# Patient Record
Sex: FEMALE | Race: WHITE | NOT HISPANIC OR LATINO | Employment: FULL TIME | URBAN - METROPOLITAN AREA
[De-identification: names, ages, dates, MRNs, and addresses within clinical notes are randomized per-mention and may not be internally consistent; named-entity substitution may affect disease eponyms.]

---

## 2018-11-08 ENCOUNTER — HOSPITAL ENCOUNTER (EMERGENCY)
Facility: CLINIC | Age: 38
Discharge: HOME | End: 2018-11-08
Attending: EMERGENCY MEDICINE | Admitting: EMERGENCY MEDICINE
Payer: COMMERCIAL

## 2018-11-08 ENCOUNTER — APPOINTMENT (OUTPATIENT)
Dept: CT IMAGING | Facility: CLINIC | Age: 38
End: 2018-11-08
Attending: EMERGENCY MEDICINE
Payer: COMMERCIAL

## 2018-11-08 VITALS
OXYGEN SATURATION: 93 % | TEMPERATURE: 98.7 F | SYSTOLIC BLOOD PRESSURE: 105 MMHG | DIASTOLIC BLOOD PRESSURE: 70 MMHG | RESPIRATION RATE: 22 BRPM | HEART RATE: 78 BPM

## 2018-11-08 DIAGNOSIS — N20.1 CALCULUS OF URETER: ICD-10-CM

## 2018-11-08 PROBLEM — R00.2 PALPITATIONS: Status: ACTIVE | Noted: 2018-08-05

## 2018-11-08 PROBLEM — G47.19 EXCESSIVE DAYTIME SLEEPINESS: Status: ACTIVE | Noted: 2018-08-05

## 2018-11-08 PROBLEM — R03.0 ELEVATED BLOOD PRESSURE READING WITHOUT DIAGNOSIS OF HYPERTENSION: Status: ACTIVE | Noted: 2018-08-05

## 2018-11-08 PROBLEM — R00.0 SINUS TACHYCARDIA: Status: ACTIVE | Noted: 2018-08-05

## 2018-11-08 LAB
ALBUMIN UR-MCNC: NEGATIVE MG/DL
ANION GAP SERPL CALCULATED.3IONS-SCNC: 7 MMOL/L (ref 3–14)
APPEARANCE UR: ABNORMAL
BILIRUB UR QL STRIP: NEGATIVE
BUN SERPL-MCNC: 16 MG/DL (ref 7–30)
CALCIUM SERPL-MCNC: 9.2 MG/DL (ref 8.5–10.1)
CHLORIDE SERPL-SCNC: 107 MMOL/L (ref 94–109)
CO2 SERPL-SCNC: 26 MMOL/L (ref 20–32)
COLOR UR AUTO: YELLOW
CREAT SERPL-MCNC: 0.91 MG/DL (ref 0.52–1.04)
GFR SERPL CREATININE-BSD FRML MDRD: 68 ML/MIN/1.7M2
GLUCOSE SERPL-MCNC: 98 MG/DL (ref 70–99)
GLUCOSE UR STRIP-MCNC: NEGATIVE MG/DL
HGB UR QL STRIP: ABNORMAL
HYALINE CASTS #/AREA URNS LPF: 1 /LPF (ref 0–2)
KETONES UR STRIP-MCNC: NEGATIVE MG/DL
LEUKOCYTE ESTERASE UR QL STRIP: ABNORMAL
MUCOUS THREADS #/AREA URNS LPF: PRESENT /LPF
NITRATE UR QL: NEGATIVE
PH UR STRIP: 7 PH (ref 5–7)
POTASSIUM SERPL-SCNC: 4.4 MMOL/L (ref 3.4–5.3)
RBC #/AREA URNS AUTO: 50 /HPF (ref 0–2)
SODIUM SERPL-SCNC: 140 MMOL/L (ref 133–144)
SOURCE: ABNORMAL
SP GR UR STRIP: 1.02 (ref 1–1.03)
SQUAMOUS #/AREA URNS AUTO: 21 /HPF (ref 0–1)
UROBILINOGEN UR STRIP-MCNC: 0 MG/DL (ref 0–2)
WBC #/AREA URNS AUTO: 7 /HPF (ref 0–5)

## 2018-11-08 PROCEDURE — 81001 URINALYSIS AUTO W/SCOPE: CPT | Performed by: EMERGENCY MEDICINE

## 2018-11-08 PROCEDURE — 96374 THER/PROPH/DIAG INJ IV PUSH: CPT

## 2018-11-08 PROCEDURE — 25000128 H RX IP 250 OP 636: Performed by: EMERGENCY MEDICINE

## 2018-11-08 PROCEDURE — 74176 CT ABD & PELVIS W/O CONTRAST: CPT

## 2018-11-08 PROCEDURE — 80048 BASIC METABOLIC PNL TOTAL CA: CPT | Performed by: EMERGENCY MEDICINE

## 2018-11-08 PROCEDURE — 99285 EMERGENCY DEPT VISIT HI MDM: CPT | Mod: 25

## 2018-11-08 PROCEDURE — 96376 TX/PRO/DX INJ SAME DRUG ADON: CPT

## 2018-11-08 PROCEDURE — 96375 TX/PRO/DX INJ NEW DRUG ADDON: CPT

## 2018-11-08 PROCEDURE — 96361 HYDRATE IV INFUSION ADD-ON: CPT

## 2018-11-08 RX ORDER — IBUPROFEN 800 MG/1
800 TABLET, FILM COATED ORAL EVERY 8 HOURS PRN
Qty: 24 TABLET | Refills: 0 | Status: SHIPPED | OUTPATIENT
Start: 2018-11-08 | End: 2018-11-15

## 2018-11-08 RX ORDER — OXYCODONE HYDROCHLORIDE 5 MG/1
5 TABLET ORAL EVERY 6 HOURS PRN
Qty: 12 TABLET | Refills: 0 | Status: SHIPPED | OUTPATIENT
Start: 2018-11-08 | End: 2022-05-07

## 2018-11-08 RX ORDER — ONDANSETRON 2 MG/ML
4 INJECTION INTRAMUSCULAR; INTRAVENOUS
Status: COMPLETED | OUTPATIENT
Start: 2018-11-08 | End: 2018-11-08

## 2018-11-08 RX ORDER — KETOROLAC TROMETHAMINE 15 MG/ML
15 INJECTION, SOLUTION INTRAMUSCULAR; INTRAVENOUS ONCE
Status: COMPLETED | OUTPATIENT
Start: 2018-11-08 | End: 2018-11-08

## 2018-11-08 RX ORDER — HYDROMORPHONE HYDROCHLORIDE 1 MG/ML
0.5 INJECTION, SOLUTION INTRAMUSCULAR; INTRAVENOUS; SUBCUTANEOUS
Status: DISCONTINUED | OUTPATIENT
Start: 2018-11-08 | End: 2018-11-08 | Stop reason: HOSPADM

## 2018-11-08 RX ORDER — ONDANSETRON 4 MG/1
4 TABLET, ORALLY DISINTEGRATING ORAL EVERY 8 HOURS PRN
Qty: 10 TABLET | Refills: 0 | Status: SHIPPED | OUTPATIENT
Start: 2018-11-08 | End: 2018-11-11

## 2018-11-08 RX ORDER — SODIUM CHLORIDE 9 MG/ML
1000 INJECTION, SOLUTION INTRAVENOUS CONTINUOUS
Status: DISCONTINUED | OUTPATIENT
Start: 2018-11-08 | End: 2018-11-08 | Stop reason: HOSPADM

## 2018-11-08 RX ORDER — ACETAMINOPHEN 500 MG
500-1000 TABLET ORAL EVERY 8 HOURS PRN
Qty: 1 TABLET | Refills: 0 | Status: SHIPPED | OUTPATIENT
Start: 2018-11-08 | End: 2018-11-18

## 2018-11-08 RX ORDER — FLUOXETINE 10 MG/1
10 CAPSULE ORAL DAILY
COMMUNITY
Start: 2018-04-29

## 2018-11-08 RX ADMIN — KETOROLAC TROMETHAMINE 15 MG: 15 INJECTION, SOLUTION INTRAMUSCULAR; INTRAVENOUS at 09:30

## 2018-11-08 RX ADMIN — Medication 0.5 MG: at 09:30

## 2018-11-08 RX ADMIN — SODIUM CHLORIDE 1000 ML: 9 INJECTION, SOLUTION INTRAVENOUS at 09:30

## 2018-11-08 RX ADMIN — ONDANSETRON 4 MG: 2 INJECTION INTRAMUSCULAR; INTRAVENOUS at 09:30

## 2018-11-08 RX ADMIN — Medication 0.5 MG: at 11:50

## 2018-11-08 ASSESSMENT — ENCOUNTER SYMPTOMS
DIARRHEA: 0
NAUSEA: 1
FEVER: 0
DYSURIA: 0
HEMATURIA: 0
FLANK PAIN: 1

## 2018-11-08 NOTE — ED AVS SNAPSHOT
[facility] Emergency Department    [address]    [facility] [address]    Phone:  [phone]    Fax:  [phone]                                       Manjula Boles   MRN: [id number]    Department:  [facility] Emergency Department   Date of Visit:  11/8/2018           Patient Information     Date Of Birth          1980        Your diagnoses for this visit were:     Calculus of ureter        You were seen by Maria Elena Aguilar MD.      Follow-up Information     Follow up with [facility], [facility] In 4 days.    Contact information:    [address]  [facility] [place] [address] [phone]          Follow up with [facility] [place].    Contact information:    [address]  [address]  [facility] [address] [phone]        Discharge Instructions       Discharge Instructions  Kidney Stones    Kidney stones are a common problem that can cause a lot of pain but fortunately are usually not dangerous. Kidney stones form in the kidney and then can cause a blockage (obstruction) of the flow of urine from the kidney which leads to pain. Most patients can manage kidney stones at home (without a hospital stay).  However, sometimes your condition may be worse than it seemed at first, or may get worse with time. Most kidney stones will pass on their own, but occasionally stones may need to be removed by an urologist.    Generally, every Emergency Department visit should have a follow-up clinic visit with either a primary or a specialty clinic/provider. Please follow-up as instructed by your emergency provider today.      Return to the Emergency Department if:    Your pain is not controlled despite the medications provided or recommended.    You are vomiting (throwing up) and cannot keep fluids or medications down.    You develop a fever (>100.4 F).    You feel much more ill or develop new symptoms.  What can I do to help  myself?    Be sure to drink plenty of fluids.    If instructed to do so, strain your urine (pee) with the urine strainer you were provided with today. Your stone may look like a grain of sand or a small pebble. Collect any stones in the cup provided and bring to your follow-up appointment.    Staying active is good, and may help the stone to pass. You may do whatever you feel up to doing without restrictions.   Treatment:    Non-steroidal anti-inflammatory drugs (NSAIDs). This includes prescription medicines like Toradol  (ketorolac) and non-prescription medicines like Advil  (ibuprofen) and Nuprin  (ibuprofen) and Naproxen. These pain relievers are very effective for kidney stones.    Nausea (sick to your stomach) medication.  Nausea and vomiting are common with kidney stones, so your provider may send you home with medicine for this.     Flomax  (tamsulosin). This medicine is sometimes used for men with prostate problems, but also can help kidney stones to pass. Its effectiveness is controversial or questionable so it is prescribed in certain situations. This medicine can lower blood pressure, and you may feel faint/lightheaded, especially when you first stand up. Be sure to get up gradually, sit down if you feel faint, and avoid activity where feeling faint would be dangerous, such as climbing ladders.  If you were given a prescription for medicine here today, be sure to read all of the information (including the package insert) that comes with your prescription.  This will include important information about the medicine, its side effects, and any warnings that you need to know about.  The pharmacist who fills the prescription can provide more information and answer questions you may have about the medicine.  If you have questions or concerns that the pharmacist cannot address, please call or return to the Emergency Department.   Remember that you can always come back to the Emergency Department if you are not  able to see your regular provider in the amount of time listed above, if you get any new symptoms, or if there is anything that worries you.  Opioid Medication Discharge Instructions    You have been given a prescription for an opioid (narcotic) pain medicine and/or have   received a pain medicine while here in the emergency department. These medicines can make you drowsy or impaired.     You must not drive, operate dangerous equipment, or   engage in any other dangerous activities while taking these medications. If you drive while taking these medications, you could be arrested for DUI, or driving under the   influence. Do not drink any alcohol while you are taking these medications.     Opioid pain medications can cause addiction. If you have a history of chemical   dependency of any type, you are at a higher risk of becoming addicted to pain   medications. Only take these prescribed medications to treat your pain when all other   options have been tried. Take it for as short a time and as few doses as possible.     Store your pain pills in a secure place, as they are frequently stolen and provide a dangerous opportunity for children or visitors in your house to start abusing these powerful medications. We will not replace any lost or stolen medicine.     As soon as your pain is better, you should safely dispose of all your remaining medication.     Many prescription pain medications contain Tylenol (acetaminophen), including Vicodin, Tylenol #3, Norco, Lortab, and Percocet. You should not take any extra pills of Tylenol if you are using these prescription medications or you can get very sick. Do not ever take more than 4000 mg of acetaminophen in any 24 hour period.    All opioids tend to cause constipation. Drink plenty of water and eat foods that have   a lot of fiber, such as fruits, vegetables, prune juice, apple juice and high fiber cereal.   Take a laxative if you don t move your bowels at least every other  day. Miralax, Milk of   Magnesia, Colace, or Senna can be used to keep you regular.        24 Hour Appointment Hotline       To make an appointment at any [facility], call [phone] ([phone]). If you don't have a family doctor or clinic, we will help you find one. [place] clinics are conveniently located to serve the needs of you and your family.             Review of your medicines      START taking        Dose / Directions Last dose taken    acetaminophen 500 MG tablet   Commonly known as:  TYLENOL   Dose:  500-1000 mg   Quantity:  1 tablet        Take 1-2 tablets (500-1,000 mg) by mouth every 8 hours as needed for mild pain (DO NOT FILL! For dosing only.) DO NOT FILL!  For Dosing Only   Refills:  0        ibuprofen 800 MG tablet   Commonly known as:  ADVIL/MOTRIN   Dose:  800 mg   Quantity:  24 tablet        Take 1 tablet (800 mg) by mouth every 8 hours as needed for moderate pain   Refills:  0        ondansetron 4 MG ODT tab   Commonly known as:  ZOFRAN ODT   Dose:  4 mg   Quantity:  10 tablet        Take 1 tablet (4 mg) by mouth every 8 hours as needed for nausea   Refills:  0        oxyCODONE IR 5 MG tablet   Commonly known as:  ROXICODONE   Dose:  5 mg   Quantity:  12 tablet        Take 1 tablet (5 mg) by mouth every 6 hours as needed for pain   Refills:  0          Our records show that you are taking the medicines listed below. If these are incorrect, please call your family doctor or clinic.        Dose / Directions Last dose taken    ALEVE PO        Refills:  0        FLUoxetine 10 MG capsule   Commonly known as:  PROzac   Dose:  10 mg        Take 10 mg by mouth daily   Refills:  0        IMITREX PO        Refills:  0        WELLBUTRIN PO        Refills:  0                Information about OPIOIDS     PRESCRIPTION OPIOIDS: WHAT YOU NEED TO KNOW   We gave you an opioid (narcotic) pain medicine. It is important to manage your pain, but opioids are not always the best choice. You should  first try all the other options your care team gave you. Take this medicine for as short a time (and as few doses) as possible.    Some activities can increase your pain, such as bandage changes or therapy sessions. It may help to take your pain medicine 30 to 60 minutes before these activities. Reduce your stress by getting enough sleep, working on hobbies you enjoy and practicing relaxation or meditation. Talk to your care team about ways to manage your pain beyond prescription opioids.    These medicines have risks:    DO NOT drive when on new or higher doses of pain medicine. These medicines can affect your alertness and reaction times, and you could be arrested for driving under the influence (DUI). If you need to use opioids long-term, talk to your care team about driving.    DO NOT operate heavy machinery    DO NOT do any other dangerous activities while taking these medicines.    DO NOT drink any alcohol while taking these medicines.     If the opioid prescribed includes acetaminophen, DO NOT take with any other medicines that contain acetaminophen. Read all labels carefully. Look for the word  acetaminophen  or  Tylenol.  Ask your pharmacist if you have questions or are unsure.    You can get addicted to pain medicines, especially if you have a history of addiction (chemical, alcohol or substance dependence). Talk to your care team about ways to reduce this risk.    All opioids tend to cause constipation. Drink plenty of water and eat foods that have a lot of fiber, such as fruits, vegetables, prune juice, apple juice and high-fiber cereal. Take a laxative (Miralax, milk of magnesia, Colace, Senna) if you don t move your bowels at least every other day. Other side effects include upset stomach, sleepiness, dizziness, throwing up, tolerance (needing more of the medicine to have the same effect), physical dependence and slowed breathing.    Store your pills in a secure place, locked if possible. We will not  replace any lost or stolen medicine. If you don t finish your medicine, please throw away (dispose) as directed by your pharmacist. The [place] [employer] has more information about safe disposal: https://www.pca.state.[place]/living-green/managing-unwanted-medications        Prescriptions were sent or printed at these locations (4 Prescriptions)                   Other Prescriptions                Printed at Department/Unit printer (4 of 4)         acetaminophen (TYLENOL) 500 MG tablet               ibuprofen (ADVIL/MOTRIN) 800 MG tablet               ondansetron (ZOFRAN ODT) 4 MG ODT tab               oxyCODONE IR (ROXICODONE) 5 MG tablet                Procedures and tests performed during your visit     Basic metabolic panel    CT Abdomen Pelvis w/o Contrast    Peripheral IV: Standard    Pulse oximetry nursing    UA with Microscopic      Orders Needing Specimen Collection     None      Pending Results     Date and Time Order Name Status Description    11/8/2018 0909 CT Abdomen Pelvis w/o Contrast Preliminary             Pending Culture Results     No orders found from 11/6/2018 to 11/9/2018.            Pending Results Instructions     If you had any lab results that were not finalized at the time of your Discharge, you can call the ED Lab Result RN at [phone]. You will be contacted by this team for any positive Lab results or changes in treatment. The nurses are available 7 days a week from 10A to 6:30P.  You can leave a message 24 hours per day and they will return your call.        Test Results From Your Hospital Stay        11/8/2018 10:04 AM      Component Results     Component Value Ref Range & Units Status    Sodium 140 133 - 144 mmol/L Final    Potassium 4.4 3.4 - 5.3 mmol/L Final    Chloride 107 94 - 109 mmol/L Final    Carbon Dioxide 26 20 - 32 mmol/L Final    Anion Gap 7 3 - 14 mmol/L Final    Glucose 98 70 - 99 mg/dL Final    Urea Nitrogen 16 7 - 30 mg/dL Final    Creatinine 0.91  0.52 - 1.04 mg/dL Final    GFR Estimate 68 >60 mL/min/1.7m2 Final    Non  GFR Calc    GFR Estimate If Black 83 >60 mL/min/1.7m2 Final    African American GFR Calc    Calcium 9.2 8.5 - 10.1 mg/dL Final         11/8/2018 10:18 AM      Component Results     Component Value Ref Range & Units Status    Color Urine Yellow  Final    Appearance Urine Cloudy  Final    Glucose Urine Negative NEG^Negative mg/dL Final    Bilirubin Urine Negative NEG^Negative Final    Ketones Urine Negative NEG^Negative mg/dL Final    Specific Gravity Urine 1.021 1.003 - 1.035 Final    Blood Urine Small (A) NEG^Negative Final    pH Urine 7.0 5.0 - 7.0 pH Final    Protein Albumin Urine Negative NEG^Negative mg/dL Final    Urobilinogen mg/dL 0.0 0.0 - 2.0 mg/dL Final    Nitrite Urine Negative NEG^Negative Final    Leukocyte Esterase Urine Trace (A) NEG^Negative Final    Source Midstream Urine  Final    WBC Urine 7 (H) 0 - 5 /HPF Final    RBC Urine 50 (H) 0 - 2 /HPF Final    Squamous Epithelial /HPF Urine 21 (H) 0 - 1 /HPF Final    Mucous Urine Present (A) NEG^Negative /LPF Final    Hyaline Casts 1 0 - 2 /LPF Final         11/8/2018 10:46 AM      Narrative     CT ABDOMEN AND PELVIS WITHOUT CONTRAST 11/8/2018 10:32 AM    HISTORY: Right flank and right lower quadrant pain.    TECHNIQUE: Helical axial scans from the dome of liver through the  pubic symphysis without contrast. Radiation dose for this scan was  reduced using automated exposure control, adjustment of the mA and/or  kV according to patient size, or iterative reconstruction technique.    COMPARISON: None.    FINDINGS: There is a 3.5 mm calculus in the distal right ureter just  proximal to the ureterovesical orifice resulting in right-sided  hydronephrosis and hydroureter. No other right-sided urinary tract  calculi. No left-sided urinary tract calculi or obstruction.    There is a very small hiatal hernia. The liver, spleen, pancreas,  bilateral adrenal glands and remainder  of the kidneys bilaterally  appear normal without contrast. The bowel and mesentery in the upper  abdomen are unremarkable with the exception of a few slightly  prominent mesenteric lymph nodes in the right lower quadrant (image 60  of series 3).    Scans through the pelvis show no additional abnormality or free fluid.  The appendix is partially visualized with no evidence for  appendicitis.        Impression     IMPRESSION:  1. 3.5 mm obstructing calculus distal right ureter with right-sided  hydronephrosis and hydroureter.  2. Few slightly prominent mesenteric lymph nodes right lower quadrant  of uncertain etiology and significance.  3. Very small hiatal hernia.                Clinical Quality Measure: Blood Pressure Screening     Your blood pressure was checked while you were in the emergency department today. The last reading we obtained was  BP: 92/47 . Please read the guidelines below about what these numbers mean and what you should do about them.  If your systolic blood pressure (the top number) is less than 120 and your diastolic blood pressure (the bottom number) is less than 80, then your blood pressure is normal. There is nothing more that you need to do about it.  If your systolic blood pressure (the top number) is 120-139 or your diastolic blood pressure (the bottom number) is 80-89, your blood pressure may be higher than it should be. You should have your blood pressure rechecked within a year by a primary care provider.  If your systolic blood pressure (the top number) is 140 or greater or your diastolic blood pressure (the bottom number) is 90 or greater, you may have high blood pressure. High blood pressure is treatable, but if left untreated over time it can put you at risk for heart attack, stroke, or kidney failure. You should have your blood pressure rechecked by a primary care provider within the next 4 weeks.  If your provider in the emergency department today gave you specific instructions  "to follow-up with your doctor or provider even sooner than that, you should follow that instruction and not wait for up to 4 weeks for your follow-up visit.        Thank you for choosing [facility]       Thank you for choosing [facility] for your care. Our goal is always to provide you with excellent care. Hearing back from our patients is one way we can continue to improve our services. Please take a few minutes to complete the written survey that you may receive in the mail after you visit with us. Thank you!        [facility] Information     [employer] lets you send messages to your doctor, view your test results, renew your prescriptions, schedule appointments and more. To sign up, go to [email or web address] . Click on \"Log in\" on the left side of the screen, which will take you to the Welcome page. Then click on \"Sign up Now\" on the right side of the page.     You will be asked to enter the access code listed below, as well as some personal information. Please follow the directions to create your username and password.     Your access code is: [email or web address]  Expires: 2019 12:16 PM     Your access code will  in 90 days. If you need help or a new code, please call your [facility] clinic or [phone].        Care EveryWhere ID     This is your Care EveryWhere ID. This could be used by other organizations to access your [facility] medical records  [id number]        Equal Access to Services     LUZ DUGGAN AH: Hadii fahad Colbert, waaxda lueden, qaybta kaalmajames chaves, sharath dietrich . So [facility] [phone].    ATENCIÓN: Si habla español, tiene a fletcher disposición servicios gratuitos de asistencia lingüística. Chucho al [phone].    We comply with applicable federal civil rights laws and [place] laws. We do not discriminate on the basis of race, color, national origin, age, disability, sex, sexual orientation, or gender identity.            After Visit Summary       This " is your record. Keep this with you and show to your community pharmacist(s) and doctor(s) at your next visit.

## 2018-11-08 NOTE — DISCHARGE INSTRUCTIONS
Discharge Instructions  Kidney Stones    Kidney stones are a common problem that can cause a lot of pain but fortunately are usually not dangerous. Kidney stones form in the kidney and then can cause a blockage (obstruction) of the flow of urine from the kidney which leads to pain. Most patients can manage kidney stones at home (without a hospital stay).  However, sometimes your condition may be worse than it seemed at first, or may get worse with time. Most kidney stones will pass on their own, but occasionally stones may need to be removed by an urologist.    Generally, every Emergency Department visit should have a follow-up clinic visit with either a primary or a specialty clinic/provider. Please follow-up as instructed by your emergency provider today.      Return to the Emergency Department if:    Your pain is not controlled despite the medications provided or recommended.    You are vomiting (throwing up) and cannot keep fluids or medications down.    You develop a fever (>100.4 F).    You feel much more ill or develop new symptoms.  What can I do to help myself?    Be sure to drink plenty of fluids.    If instructed to do so, strain your urine (pee) with the urine strainer you were provided with today. Your stone may look like a grain of sand or a small pebble. Collect any stones in the cup provided and bring to your follow-up appointment.    Staying active is good, and may help the stone to pass. You may do whatever you feel up to doing without restrictions.   Treatment:    Non-steroidal anti-inflammatory drugs (NSAIDs). This includes prescription medicines like Toradol  (ketorolac) and non-prescription medicines like Advil  (ibuprofen) and Nuprin  (ibuprofen) and Naproxen. These pain relievers are very effective for kidney stones.    Nausea (sick to your stomach) medication.  Nausea and vomiting are common with kidney stones, so your provider may send you home with medicine for this.     Flomax   (tamsulosin). This medicine is sometimes used for men with prostate problems, but also can help kidney stones to pass. Its effectiveness is controversial or questionable so it is prescribed in certain situations. This medicine can lower blood pressure, and you may feel faint/lightheaded, especially when you first stand up. Be sure to get up gradually, sit down if you feel faint, and avoid activity where feeling faint would be dangerous, such as climbing ladders.  If you were given a prescription for medicine here today, be sure to read all of the information (including the package insert) that comes with your prescription.  This will include important information about the medicine, its side effects, and any warnings that you need to know about.  The pharmacist who fills the prescription can provide more information and answer questions you may have about the medicine.  If you have questions or concerns that the pharmacist cannot address, please call or return to the Emergency Department.   Remember that you can always come back to the Emergency Department if you are not able to see your regular provider in the amount of time listed above, if you get any new symptoms, or if there is anything that worries you.  Opioid Medication Discharge Instructions    You have been given a prescription for an opioid (narcotic) pain medicine and/or have   received a pain medicine while here in the emergency department. These medicines can make you drowsy or impaired.     You must not drive, operate dangerous equipment, or   engage in any other dangerous activities while taking these medications. If you drive while taking these medications, you could be arrested for DUI, or driving under the   influence. Do not drink any alcohol while you are taking these medications.     Opioid pain medications can cause addiction. If you have a history of chemical   dependency of any type, you are at a higher risk of becoming addicted to pain    medications. Only take these prescribed medications to treat your pain when all other   options have been tried. Take it for as short a time and as few doses as possible.     Store your pain pills in a secure place, as they are frequently stolen and provide a dangerous opportunity for children or visitors in your house to start abusing these powerful medications. We will not replace any lost or stolen medicine.     As soon as your pain is better, you should safely dispose of all your remaining medication.     Many prescription pain medications contain Tylenol (acetaminophen), including Vicodin, Tylenol #3, Norco, Lortab, and Percocet. You should not take any extra pills of Tylenol if you are using these prescription medications or you can get very sick. Do not ever take more than 4000 mg of acetaminophen in any 24 hour period.    All opioids tend to cause constipation. Drink plenty of water and eat foods that have   a lot of fiber, such as fruits, vegetables, prune juice, apple juice and high fiber cereal.   Take a laxative if you don t move your bowels at least every other day. Miralax, Milk of   Magnesia, Colace, or Senna can be used to keep you regular.

## 2018-11-08 NOTE — ED PROVIDER NOTES
History     Chief Complaint:  Flank Pain       HPI   Manjula Boles is a 37 year old female who presents with flank pain. The patient states that she was driving to work at 0730 this morning when she had the sudden onset of 8/10 right flank pain radiating to her right lower quadrant. She took pepto bismol as she felt like she was going to have diarrhea, however, her symptoms did not improve. The patient does have nausea. She denies any dysuria, hematuria, diarrhea, or fevers. She has never had similar symptoms in the past.     Allergies:  Sulfa Drugs      Medications:    Wellbutrin   Aleve   Imitrex    Past Medical History:    Depressive Disorder  Migraines     Past Surgical History:    Hysterectomy     Family History:    History reviewed. No pertinent family history.     Social History:  Marital Status:   Presents to the ED with   Tobacco Use: Never Used  Alcohol Use: Yes  PCP: Jessica Castillo        Review of Systems   Constitutional: Negative for fever.   Gastrointestinal: Positive for nausea. Negative for diarrhea.   Genitourinary: Positive for flank pain. Negative for dysuria and hematuria.   All other systems reviewed and are negative.      Physical Exam     Patient Vitals for the past 24 hrs:   BP Temp Temp src Pulse Resp SpO2   11/08/18 1130 101/66 - - - - 96 %   11/08/18 1115 117/76 - - - - 97 %   11/08/18 1100 102/76 - - - - 98 %   11/08/18 1045 107/66 - - - - 98 %   11/08/18 1000 123/82 - - - - 95 %   11/08/18 0945 121/78 - - - - 99 %   11/08/18 0930 133/81 - - - - 97 %   11/08/18 0915 144/88 - - - - 98 %   11/08/18 0903 (!) 174/94 98.7  F (37.1  C) Oral 98 22 100 %      Physical Exam   Nursing note and vitals reviewed.    Constitutional: Pleasant and well groomed. Appears Uncomfortable. Mildly pale appearing.          HENT:    Mouth/Throat: Oropharynx is without swelling or erythema. Oral mucosa moist.    Eyes: Conjunctivae are normal. No scleral icterus.    Neck: Neck supple.    Cardiovascular: Normal rate, regular rhythm and intact distal pulses.    Pulmonary/Chest: Effort normal and breath sounds normal.   Abdominal: Soft.  No distension. There is no tenderness.   Musculoskeletal:  No edema, No calf tenderness  Neurological:Alert and oriented. Coordination normal.   Skin: Skin is warm and dry.   Psychiatric: Normal mood and affect.      Emergency Department Course   Imaging:  CT Abdomen Pelvis without contrast;   1. 3.5 mm obstructing calculus distal right ureter with right-sided  hydronephrosis and hydroureter.  2. Few slightly prominent mesenteric lymph nodes right lower quadrant  of uncertain etiology and significance.  3. Very small hiatal hernia.  Preliminary radiology read.     Radiographic findings were communicated with the patient who voiced understanding of the findings.    Laboratory:  BMP:  WNL (Creatinine 0.91)     UA: Cloudy yellow urine, Blood small, Leukocyte Esterase trace, WBC 7 (H), RBC 50 (H), Squamous Epithelial 21 (H), Mucous present, otherwise WNL     Interventions:   (0930) Normal Saline, 1 liter, IV   (0930) Dilaudid, 0.5 mg, IV   (0930) Zofran, 4 mg, IV   (0930) Toradol, 15 mg, IV  (1150) Normal Saline, 1 liter, IV   (1150) Dilaudid, 0.5 mg, IV     Emergency Department Course:  Nursing notes and vitals reviewed.  (0902) I performed an exam of the patient as documented above.    A peripheral IV was established. Blood was drawn from the patient. This was sent for laboratory testing, findings above.    Urine sample was obtained and sent for laboratory analysis, findings above.   The patient was sent for a CT Abdomen while in the emergency department, findings above.    (1102) I updated the patient on results and plan.   (1141) I answered the patient's call light. Her pain is returning. RN is informed on need for additional pain medication.   Findings and plan explained to the patient. Patient discharged home with instructions regarding supportive care,  medications, and reasons to return. The importance of close follow-up was reviewed.   I personally reviewed the laboratory results with the patient and answered all related questions prior to discharge.      Impression & Plan      Medical Decision Making:  The patient presented with unilateral flank and abdominal pain. On arrival the differential diagnosis included, but was not limited to nephrolithiasis, pyelonephritis, AAA, ovarian torsion.  CT confirms a ureteral stone.  The patient's symptoms have been controlled with described interventions in the Emergency Department. There is no fever or evidence of a urinary tract infection.      The patient will be discharged with opioid analgesics and Ibuprofen for pain. I have explained the importance of short term NSAID use in the management of ureteral colic.     The patient understands to return to the ED with uncontrolled pain, vomiting, and fever.  I have recommended that she strain their urine to look for stone, if detected, submit to primary doctor/urologist for lab analysis.        Plan:   1. Return to the ED with new or worse symptoms  2. Follow up with your PMD in 4 days for ongoing evaluation and management  3. Follow up with urology within one week, sooner if pain continues, as retrieval of the stone may be required for refractory symptoms.  4. Provided with standard [facility] for Kid[facility] Stones  5. Prescriptions for tylenol, ibuprofen, Zofran, and Roxicodone were provided.        Diagnosis:    ICD-10-CM    1. Calculus of ureter N20.1        Disposition:  discharged to home    Discharge Medications:  New Prescriptions    ACETAMINOPHEN (TYLENOL) 500 MG TABLET    Take 1-2 tablets (500-1,000 mg) by mouth every 8 hours as needed for mild pain (DO NOT FILL! For dosing only.) DO NOT FILL!   For Dosing Only    IBUPROFEN (ADVIL/MOTRIN) 800 MG TABLET    Take 1 tablet (800 mg) by mouth every 8 hours as needed for moderate pain    ONDANSETRON (ZOFRAN  ODT) 4 MG ODT TAB    Take 1 tablet (4 mg) by mouth every 8 hours as needed for nausea    OXYCODONE IR (ROXICODONE) 5 MG TABLET    Take 1 tablet (5 mg) by mouth every 6 hours as needed for pain         IBrittany, am serving as a scribe on 11/8/2018 at 9:02 AM to personally document services performed by Dr. Maria Elena Aguilar based on my observations and the provider's statements to me.    11/8/2018   [facility] EMERGENCY DEPARTMENT       Maria Elena Aguilar MD  11/08/18 6406

## 2018-11-08 NOTE — ED AVS SNAPSHOT
[facility]    [address]    [facility] [address]    Phone:  [phone]    Fax:  [phone]                                       Manjula Boles   MRN: [id number]    Department:  [facility] Emergency Department   Date of Visit:  11/8/2018           After Visit Summary Signature Page     I have received my discharge instructions, and my questions have been answered. I have discussed any challenges I see with this plan with the nurse or doctor.    ..........................................................................................................................................  Patient/Patient Representative Signature      ..........................................................................................................................................  Patient Representative Print Name and Relationship to Patient    ..................................................               ................................................  Date                                   Time    ..........................................................................................................................................  Reviewed by Signature/Title    ...................................................              ..............................................  Date                                               Time          22EPIC Rev 08/18

## 2018-12-06 ENCOUNTER — OFFICE VISIT (OUTPATIENT)
Dept: UROLOGY | Facility: CLINIC | Age: 38
End: 2018-12-06
Payer: COMMERCIAL

## 2018-12-06 VITALS — OXYGEN SATURATION: 98 % | BODY MASS INDEX: 43.24 KG/M2 | HEIGHT: 62 IN | WEIGHT: 235 LBS | HEART RATE: 86 BPM

## 2018-12-06 DIAGNOSIS — N20.0 NEPHROLITHIASIS: Primary | ICD-10-CM

## 2018-12-06 DIAGNOSIS — R39.89 SENSATION OF PRESSURE IN BLADDER AREA: ICD-10-CM

## 2018-12-06 PROBLEM — E66.01 MORBID OBESITY (H): Status: ACTIVE | Noted: 2018-12-06

## 2018-12-06 LAB
ALBUMIN UR-MCNC: NEGATIVE MG/DL
APPEARANCE UR: CLEAR
BILIRUB UR QL STRIP: NEGATIVE
COLOR UR AUTO: YELLOW
GLUCOSE UR STRIP-MCNC: NEGATIVE MG/DL
HGB UR QL STRIP: ABNORMAL
KETONES UR STRIP-MCNC: NEGATIVE MG/DL
LEUKOCYTE ESTERASE UR QL STRIP: NEGATIVE
NITRATE UR QL: NEGATIVE
PH UR STRIP: 5.5 PH (ref 5–7)
RESIDUAL VOLUME (RV) (EXTERNAL): 39
SOURCE: ABNORMAL
SP GR UR STRIP: >1.03 (ref 1–1.03)
UROBILINOGEN UR STRIP-ACNC: 0.2 EU/DL (ref 0.2–1)

## 2018-12-06 PROCEDURE — 99243 OFF/OP CNSLTJ NEW/EST LOW 30: CPT | Performed by: UROLOGY

## 2018-12-06 PROCEDURE — 81003 URINALYSIS AUTO W/O SCOPE: CPT | Performed by: UROLOGY

## 2018-12-06 ASSESSMENT — PAIN SCALES - GENERAL: PAINLEVEL: MILD PAIN (2)

## 2018-12-06 NOTE — NURSING NOTE
Pt passed her stone on 11-9-18.  Pt states she has bladder pressure.  Pt states she is starting to leak urine.  She was walking and just leaked some urine.  Pt still has dull ache R side flank pain/discomfort.  pvr by scanner=39mL  SILVANO Ruiz, CMA

## 2018-12-06 NOTE — MR AVS SNAPSHOT
After Visit Summary   12/6/2018    Manjula Rivas    MRN: [id number]           Patient Information     Date Of Birth          1980        Visit Information        Provider Department      12/6/2018 2:45 PM Oliverio Varghese MD [facility] [place]        Today's Diagnoses     Nephrolithiasis    -  1    Sensation of pressure in bladder area        Morbid obesity (H)           Follow-ups after your visit        Your next 10 appointments already scheduled     Dec 07, 2018  2:15 PM CST   CT PELVIS SOFT TISSUE WO CONTRAST with [id number]   [facility] ([facility])    [address]  [facility] [address] [phone]           How do I prepare for my exam? (Food and drink instructions) No Food and Drink Restrictions.  How do I prepare for my exam? (Other instructions) You do not need to do anything special to prepare for this exam. For a sinus scan: Use your nose spray (nasal decongestant spray) as directed.  What should I wear: Please wear loose clothing, such as a sweat suit or jogging clothes. Avoid snaps, zippers and other metal. We may ask you to undress and put on a hospital gown.  How long does the exam take: Most scans take less than 20 minutes.  What should I bring: Please bring any scans or X-rays taken at other hospitals, if similar tests were done. Also bring a list of your medicines, including vitamins, minerals and over-the-counter drugs. It is safest to leave personal items at home.  Do I need a : No  is needed.  What do I need to tell my doctor? Be sure to tell your doctor: * If you have any allergies. * If there s any chance you are pregnant. * If you are breastfeeding.  What should I do after the exam: No restrictions, you may resume normal activities.  What is this test: A CT (computed tomography) scan is a series of pictures that allows us to look inside your  "body. The scanner creates images of the body in cross sections, much like slices of bread. This helps us see any problems more clearly.  Who should I call with questions: If you have any questions, please call the Imaging Department where you will have your exam. Directions, parking instructions, and other information are available on our website, [email or web address].              Future tests that were ordered for you today     Open Future Orders        Priority Expected Expires Ordered    CT Pelvis Soft Tissue wo Contrast Routine  12/6/2019 12/6/2018            Who to contact     If you have questions or need follow up information about today's clinic visit or your schedule please contact [facility] [place] directly at [phone].  Normal or non-critical lab and imaging results will be communicated to you by [employer], letter or phone within 4 business days after the clinic has received the results. If you do not hear from us within 7 days, please contact the clinic through [employer] or phone. If you have a critical or abnormal lab result, we will notify you by phone as soon as possible.  Submit refill requests through [employer] or call your pharmacy and they will forward the refill request to us. Please allow 3 business days for your refill to be completed.          Additional Information About Your Visit        [employer] Information     [employer] lets you send messages to your doctor, view your test results, renew your prescriptions, schedule appointments and more. To sign up, go to [email or web address] . Click on \"Log in\" on the left side of the screen, which will take you to the Welcome page. Then click on \"Sign up Now\" on the right side of the page.     You will be asked to enter the access code listed below, as well as some personal information. Please follow the directions to create your username and password.     Your access code is: [email or web address]  Expires: 2/6/2019 12:16 " "PM     Your access code will  in 90 days. If you need help or a new code, please call your [place] clinic or [phone].        Care EveryWhere ID     This is your Care EveryWhere ID. This could be used by other organizations to access your [place] medical records  [id number]        Your Vitals Were     Pulse Height Pulse Oximetry BMI (Body Mass Index)          86 1.575 m (5' 2\") 98% 42.98 kg/m2         Blood Pressure from Last 3 Encounters:   18 105/70   16 123/81    Weight from Last 3 Encounters:   18 106.6 kg (235 lb)              We Performed the Following     Bladder scan     UA without Microscopic        Primary Care Provider Office Phone # Fax #    [facility] [phone] [address] [address]        Equal Access to Services     LUZ DUGGAN : Hadii aad ku hadasho Soomaali, waaxda luqadaha, qaybta kaalmada adeegyada, sharath daniel hayglennan carolynn dietrich . So [facility] [phone].    ATENCIÓN: Si habla español, tiene a fletcher disposición servicios gratuitos de asistencia lingüística. Chucho al [phone].    We comply with applicable federal civil rights laws and [place] laws. We do not discriminate on the basis of race, color, national origin, age, disability, sex, sexual orientation, or gender identity.            Thank you!     Thank you for choosing [facility] [place]  for your care. Our goal is always to provide you with excellent care. Hearing back from our patients is one way we can continue to improve our services. Please take a few minutes to complete the written survey that you may receive in the mail after your visit with us. Thank you!             Your Updated Medication List - Protect others around you: Learn how to safely use, store and throw away your medicines at www.disposemymeds.org.          This list is accurate as of 18  3:18 PM.  Always use your most " recent med list.                   Brand Name Dispense Instructions for use Diagnosis    ALEVE PO           FLUoxetine 10 MG capsule    PROzac     Take 10 mg by mouth daily        IMITREX PO           oxyCODONE 5 MG tablet    ROXICODONE    12 tablet    Take 1 tablet (5 mg) by mouth every 6 hours as needed for pain        WELLBUTRIN PO

## 2018-12-06 NOTE — PROGRESS NOTES
Chief Complaint:    Kidney/Ureteral Stone         Consult or Referral:     Mr. Manjula Rivas is a 37 year old female seen at the request of Dr. Park.         History of Present Illness:    Manjula Rivas is a very pleasant 37 year old female who presents with a history of right ureteral stone. Initially presented 11/8/2018 with right-sided pain - 4mm right distal ureteral stone identified. Had pain improvement shortly thereafter, but to her knowledge has not passed stone. Still with some right flank pain. Not as severe, but has some chronic ache in the right flank that has persisted. Occasional sharp discomfort. No gross hematuria. No dysuria. No fevers/chills. No previous stones. Does note some occasional feeling of incomplete emptying and mild bladder spasm/pain.         Past Medical History:     Past Medical History:   Diagnosis Date     Depressive disorder      Hernia, abdominal      Migraines      Palpitations      STD (sexually transmitted disease)           Past Surgical History:   Hysterectomy         Medications     Current Outpatient Prescriptions   Medication     BuPROPion HCl (WELLBUTRIN PO)     FLUoxetine (PROZAC) 10 MG capsule     Naproxen Sodium (ALEVE PO)     oxyCODONE IR (ROXICODONE) 5 MG tablet     SUMAtriptan Succinate (IMITREX PO)     No current facility-administered medications for this visit.           Family History:   No family history of stones         Social History:     Social History     Social History     Marital status:      Spouse name: N/A     Number of children: N/A     Years of education: N/A     Occupational History     Not on file.     Social History Main Topics     Smoking status: Never Smoker     Smokeless tobacco: Never Used     Alcohol use Yes     Drug use: No     Sexual activity: Not on file     Other Topics Concern     Not on file     Social History Narrative   Works as          Allergies:   Sulfa drugs - hives         Review of Systems:   "From intake questionnaire     Skin: negative  Eyes: negative  Ears/Nose/Throat: negative  Respiratory: No shortness of breath, dyspnea on exertion, cough, or hemoptysis  Cardiovascular: No chest pain or palpitations  Gastrointestinal: negative; no nausea/vomiting, constipation or diarrhea  Genitourinary: as per HPI  Musculoskeletal: negative  Neurologic: negative  Psychiatric: negative  Hematologic/Lymphatic/Immunologic: negative  Endocrine: negative         Physical Exam:     Patient is a 37 year old  female   Vitals: Pulse 86, height 1.575 m (5' 2\"), weight 106.6 kg (235 lb), SpO2 98 %.  Constitutional: Body mass index is 42.98 kg/(m^2).  Alert, no acute distress, oriented, conversant  Eyes: no scleral icterus; extraocular muscles intact, moist conjunctivae  Neck: trachea midline, no thyromegaly  Ears/nose/mouth: throat/mouth:normal, good dentition  Respiratory: no respiratory distress, or pursed lip breathing  Cardiovascular: pulses strong and intact; no obvious jugular venous distension present  Gastrointestinal: soft, nontender, no organomegaly or masses,   Lymphatics: No inguinal adenopathy  Musculoskeltal: extremities normal, no peripheral edema  Skin: no suspicious lesions or rashes  Neuro: Alert, oriented, speech and mentation normal  Psych: affect and mood normal, alert and oriented to person, place and time  Gait: Normal    PVR = 39 ml      Labs and Pathology:    I reviewed all applicable laboratory and pathology data and went over findings with patient  Significant for   Lab Results   Component Value Date    CR 0.91 11/08/2018       Imaging:    I reviewed all applicable imaging and went over findings with patient.  CT abd/pelvis 11/8/2018  IMPRESSION:  1. 3.5 mm obstructing calculus distal right ureter with right-sided  hydronephrosis and hydroureter.  2. Few slightly prominent mesenteric lymph nodes right lower quadrant  of uncertain etiology and significance.  3. Very small hiatal hernia.      Outside " and Past Medical records:    I spent 10 minutes reviewing outside and past medical records.         Assessment and Plan:     Assessment: 37 year old female with history of 4 mm right distal ureteral stone. This was noted about 1 month ago, and while pain has improved, she still has some persistent right flank pain and has not visibly passed the stone. We discussed options today and that imaging to confirm stone passage would be reasonable. Will plan for CT pelvis. If stone still present, we discussed role for ureteroscopy and stone removal.    Plan:  CT pelvis - will call with results and plan for elective ureteroscopy if stone still present    Orders  Orders Placed This Encounter   Procedures     CT Pelvis Soft Tissue wo Contrast     UA without Microscopic     Bladder scan     Oliverio Varghese MD  Urology  [facility]  Clinic Phone [phone]

## 2018-12-06 NOTE — LETTER
12/6/2018       RE: Manjula Rivas  [address] [facility] [address]     Dear Colleague,    Thank you for referring your patient, Manjula Rivas, to the [facility]OLOG[facility] [place] at [facility]. Please see a copy of my visit note below.          Chief Complaint:    Kidney/Ureteral Stone         Consult or Referral:     Mr. Manjula Rivas is a 37 year old female seen at the request of Dr. Park.         History of Present Illness:    Manjula Rivas is a very pleasant 37 year old female who presents with a history of right ureteral stone. Initially presented 11/8/2018 with right-sided pain - 4mm right distal ureteral stone identified. Had pain improvement shortly thereafter, but to her knowledge has not passed stone. Still with some right flank pain. Not as severe, but has some chronic ache in the right flank that has persisted. Occasional sharp discomfort. No gross hematuria. No dysuria. No fevers/chills. No previous stones. Does note some occasional feeling of incomplete emptying and mild bladder spasm/pain.         Past Medical History:     Past Medical History:   Diagnosis Date     Depressive disorder      Hernia, abdominal      Migraines      Palpitations      STD (sexually transmitted disease)           Past Surgical History:   Hysterectomy         Medications     Current Outpatient Prescriptions   Medication     BuPROPion HCl (WELLBUTRIN PO)     FLUoxetine (PROZAC) 10 MG capsule     Naproxen Sodium (ALEVE PO)     oxyCODONE IR (ROXICODONE) 5 MG tablet     SUMAtriptan Succinate (IMITREX PO)     No current facility-administered medications for this visit.           Family History:   No family history of stones         Social History:     Social History     Social History     Marital status:      Spouse name: N/A     Number of children: N/A     Years of education: N/A     Occupational History     Not on file.     Social History Main  "Topics     Smoking status: Never Smoker     Smokeless tobacco: Never Used     Alcohol use Yes     Drug use: No     Sexual activity: Not on file     Other Topics Concern     Not on file     Social History Narrative   Works as          Allergies:   Sulfa drugs - hives         Review of Systems:  From intake questionnaire     Skin: negative  Eyes: negative  Ears/Nose/Throat: negative  Respiratory: No shortness of breath, dyspnea on exertion, cough, or hemoptysis  Cardiovascular: No chest pain or palpitations  Gastrointestinal: negative; no nausea/vomiting, constipation or diarrhea  Genitourinary: as per HPI  Musculoskeletal: negative  Neurologic: negative  Psychiatric: negative  Hematologic/Lymphatic/Immunologic: negative  Endocrine: negative         Physical Exam:     Patient is a 37 year old  female   Vitals: Pulse 86, height 1.575 m (5' 2\"), weight 106.6 kg (235 lb), SpO2 98 %.  Constitutional: Body mass index is 42.98 kg/(m^2).  Alert, no acute distress, oriented, conversant  Eyes: no scleral icterus; extraocular muscles intact, moist conjunctivae  Neck: trachea midline, no thyromegaly  Ears/nose/mouth: throat/mouth:normal, good dentition  Respiratory: no respiratory distress, or pursed lip breathing  Cardiovascular: pulses strong and intact; no obvious jugular venous distension present  Gastrointestinal: soft, nontender, no organomegaly or masses,   Lymphatics: No inguinal adenopathy  Musculoskeltal: extremities normal, no peripheral edema  Skin: no suspicious lesions or rashes  Neuro: Alert, oriented, speech and mentation normal  Psych: affect and mood normal, alert and oriented to person, place and time  Gait: Normal    PVR = 39 ml      Labs and Pathology:    I reviewed all applicable laboratory and pathology data and went over findings with patient  Significant for   Lab Results   Component Value Date    CR 0.91 11/08/2018       Imaging:    I reviewed all applicable imaging and went over " findings with patient.  CT abd/pelvis 11/8/2018  IMPRESSION:  1. 3.5 mm obstructing calculus distal right ureter with right-sided  hydronephrosis and hydroureter.  2. Few slightly prominent mesenteric lymph nodes right lower quadrant  of uncertain etiology and significance.  3. Very small hiatal hernia.      Outside and Past Medical records:    I spent 10 minutes reviewing outside and past medical records.         Assessment and Plan:     Assessment: 37 year old female with history of 4 mm right distal ureteral stone. This was noted about 1 month ago, and while pain has improved, she still has some persistent right flank pain and has not visibly passed the stone. We discussed options today and that imaging to confirm stone passage would be reasonable. Will plan for CT pelvis. If stone still present, we discussed role for ureteroscopy and stone removal.    Plan:  CT pelvis - will call with results and plan for elective ureteroscopy if stone still present    Orders  Orders Placed This Encounter   Procedures     CT Pelvis Soft Tissue wo Contrast     UA without Microscopic     Bladder scan     Oliverio Varghese MD  Urology  [facility]  Clinic Phone [phone]        Again, thank you for allowing me to participate in the care of your patient.      Sincerely,    Oliverio Varghese MD

## 2018-12-06 NOTE — LETTER
Date:December 7, 2018      Patient was self referred, no letter generated. Do not send.        [facility] Health Information

## 2018-12-07 ENCOUNTER — HOSPITAL ENCOUNTER (OUTPATIENT)
Dept: CT IMAGING | Facility: CLINIC | Age: 38
Discharge: HOME | End: 2018-12-07
Attending: UROLOGY | Admitting: UROLOGY
Payer: COMMERCIAL

## 2018-12-07 ENCOUNTER — TELEPHONE (OUTPATIENT)
Dept: UROLOGY | Facility: CLINIC | Age: 38
End: 2018-12-07

## 2018-12-07 DIAGNOSIS — N20.0 NEPHROLITHIASIS: ICD-10-CM

## 2018-12-07 PROCEDURE — 72192 CT PELVIS W/O DYE: CPT

## 2018-12-07 NOTE — TELEPHONE ENCOUNTER
Called Manjula with result and f/u direction as below. She expressed understanding.    ----- Message from Oliverio Varghese MD sent at 12/7/2018  3:22 PM CST -----  Please call Manjula and let her know the stone is no longer there on her CT and has passed. She can follow-up with us as needed.    Thanks!  Kwame

## 2019-08-19 ENCOUNTER — HOSPITAL ENCOUNTER (EMERGENCY)
Facility: CLINIC | Age: 39
Discharge: HOME | End: 2019-08-19
Attending: EMERGENCY MEDICINE | Admitting: EMERGENCY MEDICINE
Payer: COMMERCIAL

## 2019-08-19 VITALS
HEART RATE: 94 BPM | SYSTOLIC BLOOD PRESSURE: 126 MMHG | DIASTOLIC BLOOD PRESSURE: 83 MMHG | TEMPERATURE: 98.6 F | OXYGEN SATURATION: 95 % | RESPIRATION RATE: 18 BRPM

## 2019-08-19 DIAGNOSIS — R51.9 ACUTE NONINTRACTABLE HEADACHE, UNSPECIFIED HEADACHE TYPE: ICD-10-CM

## 2019-08-19 PROCEDURE — 96361 HYDRATE IV INFUSION ADD-ON: CPT

## 2019-08-19 PROCEDURE — 99284 EMERGENCY DEPT VISIT MOD MDM: CPT | Mod: 25

## 2019-08-19 PROCEDURE — 96375 TX/PRO/DX INJ NEW DRUG ADDON: CPT

## 2019-08-19 PROCEDURE — 96376 TX/PRO/DX INJ SAME DRUG ADON: CPT

## 2019-08-19 PROCEDURE — 96374 THER/PROPH/DIAG INJ IV PUSH: CPT

## 2019-08-19 PROCEDURE — 25000128 H RX IP 250 OP 636: Performed by: EMERGENCY MEDICINE

## 2019-08-19 RX ORDER — DEXAMETHASONE SODIUM PHOSPHATE 10 MG/ML
10 INJECTION, SOLUTION INTRAMUSCULAR; INTRAVENOUS ONCE
Status: COMPLETED | OUTPATIENT
Start: 2019-08-19 | End: 2019-08-19

## 2019-08-19 RX ORDER — METOCLOPRAMIDE HYDROCHLORIDE 5 MG/ML
10 INJECTION INTRAMUSCULAR; INTRAVENOUS ONCE
Status: COMPLETED | OUTPATIENT
Start: 2019-08-19 | End: 2019-08-19

## 2019-08-19 RX ORDER — KETOROLAC TROMETHAMINE 15 MG/ML
15 INJECTION, SOLUTION INTRAMUSCULAR; INTRAVENOUS ONCE
Status: COMPLETED | OUTPATIENT
Start: 2019-08-19 | End: 2019-08-19

## 2019-08-19 RX ORDER — DIPHENHYDRAMINE HYDROCHLORIDE 50 MG/ML
25 INJECTION INTRAMUSCULAR; INTRAVENOUS ONCE
Status: COMPLETED | OUTPATIENT
Start: 2019-08-19 | End: 2019-08-19

## 2019-08-19 RX ORDER — METOCLOPRAMIDE HYDROCHLORIDE 5 MG/ML
5 INJECTION INTRAMUSCULAR; INTRAVENOUS ONCE
Status: COMPLETED | OUTPATIENT
Start: 2019-08-19 | End: 2019-08-19

## 2019-08-19 RX ADMIN — KETOROLAC TROMETHAMINE 15 MG: 15 INJECTION, SOLUTION INTRAMUSCULAR; INTRAVENOUS at 19:15

## 2019-08-19 RX ADMIN — DEXAMETHASONE SODIUM PHOSPHATE 10 MG: 10 INJECTION, SOLUTION INTRAMUSCULAR; INTRAVENOUS at 18:09

## 2019-08-19 RX ADMIN — METOCLOPRAMIDE 5 MG: 5 INJECTION, SOLUTION INTRAMUSCULAR; INTRAVENOUS at 19:15

## 2019-08-19 RX ADMIN — SODIUM CHLORIDE 1000 ML: 9 INJECTION, SOLUTION INTRAVENOUS at 18:08

## 2019-08-19 RX ADMIN — METOCLOPRAMIDE 10 MG: 5 INJECTION, SOLUTION INTRAMUSCULAR; INTRAVENOUS at 18:09

## 2019-08-19 RX ADMIN — DIPHENHYDRAMINE HYDROCHLORIDE 25 MG: 50 INJECTION, SOLUTION INTRAMUSCULAR; INTRAVENOUS at 18:08

## 2019-08-19 ASSESSMENT — ENCOUNTER SYMPTOMS: HEADACHES: 1

## 2019-08-19 NOTE — ED TRIAGE NOTES
Patient presents with headache since last Wednesday which has worsened in the past 24 hours. This headache is different from her others per report. ABC's intact.

## 2019-08-19 NOTE — ED AVS SNAPSHOT
[facility] Emergency Department  [address]  [facility] [address]  Phone:  [phone]  Fax:  [phone]                                    Manjula Rivas   MRN: [id number]    Department:  [facility] Emergency Department   Date of Visit:  8/19/2019           After Visit Summary Signature Page    I have received my discharge instructions, and my questions have been answered. I have discussed any challenges I see with this plan with the nurse or doctor.    ..........................................................................................................................................  Patient/Patient Representative Signature      ..........................................................................................................................................  Patient Representative Print Name and Relationship to Patient    ..................................................               ................................................  Date                                   Time    ..........................................................................................................................................  Reviewed by Signature/Title    ...................................................              ..............................................  Date                                               Time          22EPIC Rev 08/18

## 2019-08-19 NOTE — ED PROVIDER NOTES
History     Chief Complaint:  Headache    HPI   Manjula Rivas is a 38 year old female who presents with headache. The patient was initially seen in urgent care for double vision, hot flashes, vomiting, and headache, sent here for further evaluation, prompting her presentation. Here, the patient reports onset of headache 5 days ago, 8/14, worsening today around 0800. The patient reports that she she thought her headache was stress related, so she thought it would go away, but it increased in intensity today, and it worsens with positional changes. She has not taken anything for symptom relief. The patient notes that she has come to the emergency department for prior migraines before, though it has been over a year since she has had a migraine. Patient denies any chance of pregnancy. Patient states that she has caffeine daily.     Allergies:  Sulfa drugs     Medications:    Wellbutrin  Prozac  Imitrex    Past Medical History:    Depressive disorder  Hernia, abdominal  Migraines  Palpitations  STD    Past Surgical History:    Hysterectomy  Neuroma surgery    Family History:    Celiac diease  Bhatt's esophagus  Hyperlipidemia  Hypertension    Social History:  Smoking status: Never smoker  Alcohol use: Yes  Drug use: No  PCP: Jessica Castillo  Presents to the ED with her spouse  Marital Status:       Review of Systems   Neurological: Positive for headaches.   All other systems reviewed and are negative.        Physical Exam     Patient Vitals for the past 24 hrs:   BP Temp Temp src Pulse Resp SpO2   08/19/19 1627 (!) 138/97 98.6  F (37  C) Oral 101 18 99 %       Physical Exam  General: Patient is alert and interactive when I enter the room.     She appears in mild distress.  Head:  The scalp, face, and head appear normal  Eyes:  No photophobia.    Conjunctivae and sclerae are normal  ENT:    External acoustic canals are normal    The oropharynx is normal without erythema.     Uvula is in the  midline  Neck:  Normal range of motion  CV:  Regular rate. S1/S2. No murmurs.   Resp:  Lungs are clear without wheezes or rales. No distress  GI:  Abdomen is soft, no rigidity, guarding, or rebound    No distension. No tenderness to palpation in any quadrant.     MS:  Normal tone. Joints grossly normal without effusions.     No asymmetric leg swelling, calf or thigh tenderness.      Normal motor assessment of all extremities.    There is no cervical paraspinal tenderness.  Skin:  No rash or lesions noted. Normal capillary refill noted  Neuro:  Speech is normal and fluent. Face is symmetric without droop.     Moving all extremities well. CN's II-XII intact. 5/5 UE and LE    strength. PERRLA. EOMI without nystagmus. Reflexes symmetric.     Sensation intact to light touch. Negative pronator drift.    Finger to nose intact from a pronator drift position.   Psych: Awake. Alert.  Normal affect.  Appropriate interactions.  Lymph: No anterior cervical lymphadenopathy noted    Emergency Department Course   Interventions:  1808: NS 1L IV Bolus  1808: 25 mg Benadryl IV  1809: 10 mg Reglan IV  1809: 10 mg Decadron IV  1915: 15 mg Toradol IV  1915: 5 mg Reglan IV    Emergency Department Course:  Past medical records, nursing notes, and vitals reviewed.  1731: I performed an exam of the patient and obtained history, as documented above.    1900: I rechecked the patient. She states that her headache has not really improved. Explained findings to patient and her spouse.    2030: I rechecked the patient. Patient passed a walking trial. Headache is now a 2/10.  Findings and plan explained to the patient. Patient discharged home with instructions regarding supportive care, medications, and reasons to return. The importance of close follow-up was reviewed.     Impression & Plan    Medical Decision Making:  Manjula Rivas is a 38 year old female who presents with a headache. She has history of headaches.  I considered a broad  differential diagnosis for this patient including tension, migraine, analgesic rebound, occipital neuralgia, etc.  I also considered other less common but serious causes considered included meningitis, encephalitis, subarachnoid bleed, temporal arteritis, stroke, tumor, etc.  She has no signs of serious headache etiologies at this point.  No advanced imaging is indicated, nor is CT/lumbar puncture for SAH.  Her questions were answered and she feels improved after above interventions in ED.  Supportive outpatient management is therefore indicated.  Headache precautions given for home.      Diagnosis:    ICD-10-CM   1. Acute nonintractable headache, unspecified headache type R51       Disposition: Discharged to home    I, Marybeth Vazquez, am serving as a scribe at 5:31 PM on 8/19/2019 to document services personally performed by Jayy Roach MD based on my observations and the provider's statements to me.     Marybeth Vazquez  8/19/2019   [facility] EMERGENCY DEPARTMENT       Jayy Roach MD  08/19/19 0447

## 2019-10-30 ENCOUNTER — HEALTH MAINTENANCE LETTER (OUTPATIENT)
Age: 39
End: 2019-10-30

## 2021-01-15 ENCOUNTER — HEALTH MAINTENANCE LETTER (OUTPATIENT)
Age: 41
End: 2021-01-15

## 2021-09-04 ENCOUNTER — HEALTH MAINTENANCE LETTER (OUTPATIENT)
Age: 41
End: 2021-09-04

## 2022-02-19 ENCOUNTER — HEALTH MAINTENANCE LETTER (OUTPATIENT)
Age: 42
End: 2022-02-19

## 2022-05-06 PROCEDURE — 99284 EMERGENCY DEPT VISIT MOD MDM: CPT | Mod: 25

## 2022-05-07 ENCOUNTER — HOSPITAL ENCOUNTER (EMERGENCY)
Facility: CLINIC | Age: 42
Discharge: HOME | End: 2022-05-07
Attending: EMERGENCY MEDICINE | Admitting: EMERGENCY MEDICINE
Payer: COMMERCIAL

## 2022-05-07 ENCOUNTER — APPOINTMENT (OUTPATIENT)
Dept: CT IMAGING | Facility: CLINIC | Age: 42
End: 2022-05-07
Attending: EMERGENCY MEDICINE
Payer: COMMERCIAL

## 2022-05-07 VITALS
SYSTOLIC BLOOD PRESSURE: 152 MMHG | RESPIRATION RATE: 22 BRPM | TEMPERATURE: 97.2 F | DIASTOLIC BLOOD PRESSURE: 100 MMHG | OXYGEN SATURATION: 99 % | HEART RATE: 89 BPM

## 2022-05-07 DIAGNOSIS — S02.2XXA CLOSED FRACTURE OF NASAL BONE, INITIAL ENCOUNTER: ICD-10-CM

## 2022-05-07 PROCEDURE — 70486 CT MAXILLOFACIAL W/O DYE: CPT

## 2022-05-07 PROCEDURE — 250N000013 HC RX MED GY IP 250 OP 250 PS 637: Performed by: EMERGENCY MEDICINE

## 2022-05-07 RX ORDER — IBUPROFEN 600 MG/1
600 TABLET, FILM COATED ORAL ONCE
Status: COMPLETED | OUTPATIENT
Start: 2022-05-07 | End: 2022-05-07

## 2022-05-07 RX ORDER — ACETAMINOPHEN 500 MG
1000 TABLET ORAL ONCE
Status: COMPLETED | OUTPATIENT
Start: 2022-05-07 | End: 2022-05-07

## 2022-05-07 RX ORDER — ECHINACEA PURPUREA EXTRACT 125 MG
TABLET ORAL
Qty: 15 ML | Refills: 0 | Status: SHIPPED | OUTPATIENT
Start: 2022-05-07

## 2022-05-07 RX ORDER — OXYCODONE HYDROCHLORIDE 5 MG/1
5 TABLET ORAL EVERY 6 HOURS PRN
Qty: 12 TABLET | Refills: 0 | Status: SHIPPED | OUTPATIENT
Start: 2022-05-07 | End: 2022-05-10

## 2022-05-07 RX ADMIN — ACETAMINOPHEN 1000 MG: 500 TABLET, FILM COATED ORAL at 00:08

## 2022-05-07 RX ADMIN — IBUPROFEN 600 MG: 600 TABLET ORAL at 00:08

## 2022-05-07 ASSESSMENT — ENCOUNTER SYMPTOMS
BACK PAIN: 0
VOMITING: 0
ARTHRALGIAS: 1
HEADACHES: 0

## 2022-05-07 NOTE — ED TRIAGE NOTES
Pt arrives to ED after falling and hitting her nose on the hardwood floor. Pt has abrasions to both nares, bleeding controlled in triage. Pt denies LOC or thinners. Obvious swelling and bruising to nose and surrounding face.      Triage Assessment     Row Name 05/07/22 0000       Triage Assessment (Adult)    Airway WDL WDL       Respiratory WDL    Respiratory WDL WDL

## 2022-05-07 NOTE — ED PROVIDER NOTES
History   Chief Complaint:  Facial Injury       The history is provided by the patient.      Manjula Rivas is a 41 year old female with history of morbid obesity and sinus tachycardia who presents with facial injury. The patient reports that she slipped on a pair of pants earlier this evening, landing face-first on the hardwood floor. She reports pain to her face and the knuckle of her right 3rd finger at this time. She reportedly took no analgesics at home. Of note, the patient denies any use of blood thinners.  The patient endorses no syncope following her fall. She denies headache, back pain, chest pain, nausea, vomiting or other symptoms.      Review of Systems   Gastrointestinal: Negative for vomiting.   Musculoskeletal: Positive for arthralgias (face, R hand). Negative for back pain.   Neurological: Negative for headaches.   All other systems reviewed and are negative.      Allergies:  Sulfa Drugs    Medications:  Wellbutrin  Prozac    Past Medical History:     Abdominal hernia  Anxiety  Cervical cancer  Cytomegalovirus infection  Depression  Excessive daytime sleepiness  Migraines  Morbid obesity  Nephrolithiasis   Ovarian cyst  Sinus tachycardia  Unspecified STD  Vitamin D deficiency    Past Surgical History:    Colonoscopy x2  Esophagogastroduodenoscopy x2  Hysterectomy  Left forearm neuroma excision    Family History:    Mother: Hyperlipidemia, hypertension  Father: Bhatt's esophagus    Social History:  Presents to the emergency department alone   Arrives via car     Physical Exam     Patient Vitals for the past 24 hrs:   BP Temp Temp src Pulse Resp SpO2   05/07/22 0001 (!) 152/100 97.2  F (36.2  C) Temporal 89 22 99 %       Physical Exam  General: Alert. Appears comfortable  Head:  The scalp is without trauma  Eyes:  Sclera white; Pupils are equal and round  ENT:    External ears normal.  No hemotympanum.      External nares with soft tissue swelling and ecchymosis; dried epistaxis bilateral nares.   No septal hematoma.    Neck:  No midline tenderness or pain with full ROM.  CV:  Rate as above with regular rhythm   No murmur   2/2 radial and dorsal pedal pulses  Resp:  Breath sounds clear and equal bilaterally    Non-labored, no retractions or accessory muscle use  GI:  Abdomen soft, non-tender, non-distended    No rebound tenderness or guarding  MSK:  No midline tenderness or bony step-off    No deformity    Moves all extremities equally and symmetrically; no reproducible finger pain  Skin:  No rash or lesions noted.  Neuro:   No apparent deficit.    Strength 5/5 x4.  Sensation intact x4.     GCS: 15  Psych:  Normal affect.        Emergency Department Course     Imaging:  CT Facial Bones without Contrast   Final Result   IMPRESSION:    Right nasal bone and nasal tip demonstrate fractures, age-indeterminate.      Anterior nasal septum air likely posttraumatic.      Otherwise, no acute fracture.        Right medial orbital wall chronic fracture.              Report per radiology    Emergency Department Course:     Reviewed:  I reviewed nursing notes, vitals, past medical history and Care Everywhere    Assessments:  0316 I obtained history and examined the patient as noted above. I explained findings.    Interventions:  0008 Tylenol 1000 mg PO  0008 Ibuprofen 600 mg PO    Disposition:  The patient was discharged to home.     Impression & Plan     Medical Decision Making:  Patient is a 41-year-old female presenting status post mechanical fall complaining predominantly of nasal pain and knuckle pain.  She did undergo formal CT face which confirms nasal bone fractures.  Chronic appearing right orbital wall fracture identified as well which patient confirms a history of in high school.  She has no reproducible and pain on my exam and the remainder of her head to toe exam is without trauma.  There is no evidence to suggest septal hematoma on exam.  No profound epistaxis.  We will plan conservative management at this  point in time with pain control, ice to the face, avoiding aggressive nose blowing, nasal saline and close ENT follow-up.  Epistaxis precautions reviewed.  All questions addressed.    Diagnosis:    ICD-10-CM    1. Closed fracture of nasal bone, initial encounter  S02.2XXA        Discharge Medications:     START taking      Dose / Directions   sodium chloride 0.65 % nasal spray  Commonly known as: OCEAN      2 SPRAYS TO BILATERAL NARES X 7 DAYS  Quantity: 15 mL  Refills: 0        CONTINUE these medicines which have NOT CHANGED      Dose / Directions   oxyCODONE 5 MG tablet  Commonly known as: ROXICODONE      Dose: 5 mg  Take 1 tablet (5 mg) by mouth every 6 hours as needed for pain  Quantity: 12 tablet  Refills: 0           Scribe Disclosure:  I, Garland Urbina, am serving as a scribe at 3:14 AM on 5/7/2022 to document services personally performed by Sandra Marroquin DO based on my observations and the provider's statements to me.            Sandra Marroquin DO  05/07/22 0797

## 2022-10-22 ENCOUNTER — HEALTH MAINTENANCE LETTER (OUTPATIENT)
Age: 42
End: 2022-10-22

## 2023-04-01 ENCOUNTER — HEALTH MAINTENANCE LETTER (OUTPATIENT)
Age: 43
End: 2023-04-01

## 2024-01-14 ENCOUNTER — HEALTH MAINTENANCE LETTER (OUTPATIENT)
Age: 44
End: 2024-01-14

## 2024-06-02 ENCOUNTER — HEALTH MAINTENANCE LETTER (OUTPATIENT)
Age: 44
End: 2024-06-02

## 2025-02-14 ENCOUNTER — APPOINTMENT (OUTPATIENT)
Dept: CT IMAGING | Facility: CLINIC | Age: 45
End: 2025-02-14
Attending: EMERGENCY MEDICINE
Payer: COMMERCIAL

## 2025-02-14 ENCOUNTER — HOSPITAL ENCOUNTER (EMERGENCY)
Facility: CLINIC | Age: 45
Discharge: HOME | End: 2025-02-14
Attending: EMERGENCY MEDICINE | Admitting: EMERGENCY MEDICINE
Payer: COMMERCIAL

## 2025-02-14 ENCOUNTER — APPOINTMENT (OUTPATIENT)
Dept: MRI IMAGING | Facility: CLINIC | Age: 45
End: 2025-02-14
Attending: EMERGENCY MEDICINE
Payer: COMMERCIAL

## 2025-02-14 VITALS
TEMPERATURE: 98.2 F | HEIGHT: 62 IN | BODY MASS INDEX: 42.33 KG/M2 | SYSTOLIC BLOOD PRESSURE: 113 MMHG | DIASTOLIC BLOOD PRESSURE: 59 MMHG | WEIGHT: 230 LBS | RESPIRATION RATE: 14 BRPM | OXYGEN SATURATION: 97 % | HEART RATE: 99 BPM

## 2025-02-14 DIAGNOSIS — I67.1 ANEURYSM OF CIRCLE OF WILLIS: ICD-10-CM

## 2025-02-14 DIAGNOSIS — R51.9 ACUTE NONINTRACTABLE HEADACHE, UNSPECIFIED HEADACHE TYPE: ICD-10-CM

## 2025-02-14 LAB
ALBUMIN UR-MCNC: NEGATIVE MG/DL
ANION GAP SERPL CALCULATED.3IONS-SCNC: 14 MMOL/L (ref 7–15)
APPEARANCE UR: CLEAR
APTT PPP: 24 SECONDS (ref 22–38)
ATRIAL RATE - MUSE: 100 BPM
BASOPHILS # BLD AUTO: 0 10E3/UL (ref 0–0.2)
BASOPHILS NFR BLD AUTO: 0 %
BILIRUB UR QL STRIP: NEGATIVE
BUN SERPL-MCNC: 19.4 MG/DL (ref 6–20)
CALCIUM SERPL-MCNC: 9.1 MG/DL (ref 8.8–10.4)
CHLORIDE SERPL-SCNC: 103 MMOL/L (ref 98–107)
COLOR UR AUTO: ABNORMAL
CREAT SERPL-MCNC: 0.96 MG/DL (ref 0.51–0.95)
DIASTOLIC BLOOD PRESSURE - MUSE: NORMAL MMHG
EGFRCR SERPLBLD CKD-EPI 2021: 74 ML/MIN/1.73M2
EOSINOPHIL # BLD AUTO: 0.2 10E3/UL (ref 0–0.7)
EOSINOPHIL NFR BLD AUTO: 2 %
ERYTHROCYTE [DISTWIDTH] IN BLOOD BY AUTOMATED COUNT: 12.9 % (ref 10–15)
GLUCOSE SERPL-MCNC: 88 MG/DL (ref 70–99)
GLUCOSE UR STRIP-MCNC: NEGATIVE MG/DL
HCG SERPL QL: NEGATIVE
HCO3 SERPL-SCNC: 20 MMOL/L (ref 22–29)
HCT VFR BLD AUTO: 40.2 % (ref 35–47)
HGB BLD-MCNC: 13.5 G/DL (ref 11.7–15.7)
HGB UR QL STRIP: ABNORMAL
HOLD SPECIMEN: NORMAL
IMM GRANULOCYTES # BLD: 0 10E3/UL
IMM GRANULOCYTES NFR BLD: 0 %
INR PPP: 1.09 (ref 0.85–1.15)
INTERPRETATION ECG - MUSE: NORMAL
KETONES UR STRIP-MCNC: NEGATIVE MG/DL
LEUKOCYTE ESTERASE UR QL STRIP: NEGATIVE
LYMPHOCYTES # BLD AUTO: 1.2 10E3/UL (ref 0.8–5.3)
LYMPHOCYTES NFR BLD AUTO: 13 %
MCH RBC QN AUTO: 29.6 PG (ref 26.5–33)
MCHC RBC AUTO-ENTMCNC: 33.6 G/DL (ref 31.5–36.5)
MCV RBC AUTO: 88 FL (ref 78–100)
MONOCYTES # BLD AUTO: 0.4 10E3/UL (ref 0–1.3)
MONOCYTES NFR BLD AUTO: 5 %
MUCOUS THREADS #/AREA URNS LPF: PRESENT /LPF
NEUTROPHILS # BLD AUTO: 7 10E3/UL (ref 1.6–8.3)
NEUTROPHILS NFR BLD AUTO: 80 %
NITRATE UR QL: NEGATIVE
NRBC # BLD AUTO: 0 10E3/UL
NRBC BLD AUTO-RTO: 0 /100
P AXIS - MUSE: 38 DEGREES
PH UR STRIP: 5.5 [PH] (ref 5–7)
PLATELET # BLD AUTO: 327 10E3/UL (ref 150–450)
POTASSIUM SERPL-SCNC: 4.3 MMOL/L (ref 3.4–5.3)
PR INTERVAL - MUSE: 164 MS
QRS DURATION - MUSE: 78 MS
QT - MUSE: 370 MS
QTC - MUSE: 477 MS
R AXIS - MUSE: 6 DEGREES
RBC # BLD AUTO: 4.56 10E6/UL (ref 3.8–5.2)
RBC URINE: 2 /HPF
SODIUM SERPL-SCNC: 137 MMOL/L (ref 135–145)
SP GR UR STRIP: 1.01 (ref 1–1.03)
SQUAMOUS EPITHELIAL: 1 /HPF
SYSTOLIC BLOOD PRESSURE - MUSE: NORMAL MMHG
T AXIS - MUSE: 33 DEGREES
TROPONIN T SERPL HS-MCNC: <6 NG/L
UROBILINOGEN UR STRIP-MCNC: NORMAL MG/DL
VENTRICULAR RATE- MUSE: 100 BPM
WBC # BLD AUTO: 8.8 10E3/UL (ref 4–11)
WBC URINE: 1 /HPF

## 2025-02-14 PROCEDURE — 85610 PROTHROMBIN TIME: CPT | Performed by: EMERGENCY MEDICINE

## 2025-02-14 PROCEDURE — 84484 ASSAY OF TROPONIN QUANT: CPT | Performed by: EMERGENCY MEDICINE

## 2025-02-14 PROCEDURE — 81001 URINALYSIS AUTO W/SCOPE: CPT | Performed by: EMERGENCY MEDICINE

## 2025-02-14 PROCEDURE — 93005 ELECTROCARDIOGRAM TRACING: CPT

## 2025-02-14 PROCEDURE — 250N000011 HC RX IP 250 OP 636: Performed by: EMERGENCY MEDICINE

## 2025-02-14 PROCEDURE — 70496 CT ANGIOGRAPHY HEAD: CPT | Mod: 59

## 2025-02-14 PROCEDURE — 84703 CHORIONIC GONADOTROPIN ASSAY: CPT | Performed by: EMERGENCY MEDICINE

## 2025-02-14 PROCEDURE — 36415 COLL VENOUS BLD VENIPUNCTURE: CPT | Performed by: EMERGENCY MEDICINE

## 2025-02-14 PROCEDURE — 85730 THROMBOPLASTIN TIME PARTIAL: CPT | Performed by: EMERGENCY MEDICINE

## 2025-02-14 PROCEDURE — 70450 CT HEAD/BRAIN W/O DYE: CPT

## 2025-02-14 PROCEDURE — 80048 BASIC METABOLIC PNL TOTAL CA: CPT | Performed by: EMERGENCY MEDICINE

## 2025-02-14 PROCEDURE — 258N000003 HC RX IP 258 OP 636: Performed by: EMERGENCY MEDICINE

## 2025-02-14 PROCEDURE — 70553 MRI BRAIN STEM W/O & W/DYE: CPT | Mod: 59

## 2025-02-14 PROCEDURE — A9585 GADOBUTROL INJECTION: HCPCS | Performed by: EMERGENCY MEDICINE

## 2025-02-14 PROCEDURE — 85025 COMPLETE CBC W/AUTO DIFF WBC: CPT | Performed by: EMERGENCY MEDICINE

## 2025-02-14 PROCEDURE — 255N000002 HC RX 255 OP 636: Performed by: EMERGENCY MEDICINE

## 2025-02-14 PROCEDURE — 250N000009 HC RX 250: Performed by: EMERGENCY MEDICINE

## 2025-02-14 PROCEDURE — 99285 EMERGENCY DEPT VISIT HI MDM: CPT | Mod: 25

## 2025-02-14 PROCEDURE — 96374 THER/PROPH/DIAG INJ IV PUSH: CPT | Mod: 59

## 2025-02-14 PROCEDURE — 96375 TX/PRO/DX INJ NEW DRUG ADDON: CPT

## 2025-02-14 PROCEDURE — 80201 ASSAY OF TOPIRAMATE: CPT | Performed by: EMERGENCY MEDICINE

## 2025-02-14 PROCEDURE — 96361 HYDRATE IV INFUSION ADD-ON: CPT

## 2025-02-14 RX ORDER — ONDANSETRON 2 MG/ML
4 INJECTION INTRAMUSCULAR; INTRAVENOUS ONCE
Status: COMPLETED | OUTPATIENT
Start: 2025-02-14 | End: 2025-02-14

## 2025-02-14 RX ORDER — MORPHINE SULFATE 4 MG/ML
4 INJECTION, SOLUTION INTRAMUSCULAR; INTRAVENOUS ONCE
Status: COMPLETED | OUTPATIENT
Start: 2025-02-14 | End: 2025-02-14

## 2025-02-14 RX ORDER — LORAZEPAM 2 MG/ML
0.5 INJECTION INTRAMUSCULAR ONCE
Status: COMPLETED | OUTPATIENT
Start: 2025-02-14 | End: 2025-02-14

## 2025-02-14 RX ORDER — DIPHENHYDRAMINE HYDROCHLORIDE 50 MG/ML
25 INJECTION INTRAMUSCULAR; INTRAVENOUS ONCE
Status: COMPLETED | OUTPATIENT
Start: 2025-02-14 | End: 2025-02-14

## 2025-02-14 RX ORDER — METOCLOPRAMIDE HYDROCHLORIDE 5 MG/ML
10 INJECTION INTRAMUSCULAR; INTRAVENOUS ONCE
Status: COMPLETED | OUTPATIENT
Start: 2025-02-14 | End: 2025-02-14

## 2025-02-14 RX ORDER — GADOBUTROL 604.72 MG/ML
10 INJECTION INTRAVENOUS ONCE
Status: COMPLETED | OUTPATIENT
Start: 2025-02-14 | End: 2025-02-14

## 2025-02-14 RX ORDER — IOPAMIDOL 755 MG/ML
67 INJECTION, SOLUTION INTRAVASCULAR ONCE
Status: COMPLETED | OUTPATIENT
Start: 2025-02-14 | End: 2025-02-14

## 2025-02-14 RX ADMIN — METOCLOPRAMIDE 10 MG: 5 INJECTION, SOLUTION INTRAMUSCULAR; INTRAVENOUS at 13:35

## 2025-02-14 RX ADMIN — GADOBUTROL 10 ML: 604.72 INJECTION INTRAVENOUS at 17:21

## 2025-02-14 RX ADMIN — IOPAMIDOL 67 ML: 755 INJECTION, SOLUTION INTRAVENOUS at 12:40

## 2025-02-14 RX ADMIN — ONDANSETRON 4 MG: 2 INJECTION, SOLUTION INTRAMUSCULAR; INTRAVENOUS at 15:10

## 2025-02-14 RX ADMIN — LORAZEPAM 0.5 MG: 2 INJECTION INTRAMUSCULAR; INTRAVENOUS at 15:17

## 2025-02-14 RX ADMIN — MORPHINE SULFATE 4 MG: 4 INJECTION, SOLUTION INTRAMUSCULAR; INTRAVENOUS at 13:34

## 2025-02-14 RX ADMIN — SODIUM CHLORIDE 100 ML: 9 INJECTION, SOLUTION INTRAVENOUS at 12:41

## 2025-02-14 RX ADMIN — SODIUM CHLORIDE 1000 ML: 9 INJECTION, SOLUTION INTRAVENOUS at 15:11

## 2025-02-14 RX ADMIN — DIPHENHYDRAMINE HYDROCHLORIDE 25 MG: 50 INJECTION, SOLUTION INTRAMUSCULAR; INTRAVENOUS at 13:37

## 2025-02-14 ASSESSMENT — COLUMBIA-SUICIDE SEVERITY RATING SCALE - C-SSRS
1. IN THE PAST MONTH, HAVE YOU WISHED YOU WERE DEAD OR WISHED YOU COULD GO TO SLEEP AND NOT WAKE UP?: NO
6. HAVE YOU EVER DONE ANYTHING, STARTED TO DO ANYTHING, OR PREPARED TO DO ANYTHING TO END YOUR LIFE?: NO
2. HAVE YOU ACTUALLY HAD ANY THOUGHTS OF KILLING YOURSELF IN THE PAST MONTH?: NO

## 2025-02-14 ASSESSMENT — ACTIVITIES OF DAILY LIVING (ADL)
ADLS_ACUITY_SCORE: 41

## 2025-02-14 NOTE — CONSULTS
"[facility]     Stroke Code Note          History of Present Illness     Chief Complaint: Neck Pain    Manjula Rivas is a 44 year old female with past medical history of depression, anxiety, taking fluoxetine and bupropion, also she has history of migraine disorder and follows up with neurologist at [facility], takes prophylactic medication of topiramate daily and also as abortive measure she takes rizatriptan.  Presented because of acute onset of headache that started around 9 AM this morning, followed by dizziness.  Due to different feature of headache that is not similar to prior migraines, and also severity of the headache prompted her to come to the ED for further evaluation.         Past Medical History     Stroke risk factors: Migraine    Preadmission antithrombotic regimen: None    Modified Yomaira Score (Pre-morbid)  0-No deficits                 Assessment and Plan       1.  Sudden onset of headache, and dizziness likely migraine headache although the patient endorsed this is different from her usual migraine headache in terms of severity, intensity, and feature that is more posterior than her prior migraine.  Cerebrovascular disease unlikely but will recommend MRI to completely rule out.     Intravenous Thrombolysis  Not given due to:   - minor/isolated/quickly resolving symptoms     Endovascular Treatment  Not initiated due to absence of proximal vessel occlusion     Plan:  Stroke code de-escalated  MRI of the brain with and without contrast  If MRI is negative, no further stroke workup needed  Follow-up with her neurologist as an outpatient  ___________________________________________________________________    The Stroke Staff is Dr. Valentine.    Guido Cope MD  Vascular Neurology Fellow    To page me or covering stroke neurology team member, click here: AMCOM  Choose \"On Call\" tab at top, then select \"NEUROLOGY/ALL SITES\" from middle drop-down box, press Enter, then look " "for \"stroke\" or \"telestroke\" for your site.  ___________________________________________________________________        Imaging/Labs   (personally reviewed )    CT head: No acute pathology  CTA head/neck: No large vessel occlusion  CT Perfusion head: Not performed         Physical Examination     BP: 137/89   Pulse: 105   Resp: 19   Temp: 98.2  F (36.8  C)   Temp src: Oral   SpO2: 98 %            Wt Readings from Last 2 Encounters:   12/06/18 106.6 kg (235 lb)       Neurologic  Mental Status:  alert, oriented x 3, follows commands, speech clear and fluent, naming and repetition normal  Cranial Nerves:  visual fields intact, PERRL, EOMI with normal smooth pursuit, facial sensation intact and symmetric, facial movements symmetric, hearing not formally tested but intact to conversation, palate elevation symmetric and uvula midline, no dysarthria, shoulder shrug strong bilaterally, tongue protrusion midline  Motor:  normal muscle tone and bulk, no abnormal movements, able to move all limbs spontaneously, strength 5/5 throughout upper and lower extremities, no pronator drift  Reflexes:  toes down-going  Sensory:  light touch sensation intact and symmetric throughout upper and lower extremities, no extinction on double simultaneous stimulation   Coordination:  normal finger-to-nose and heel-to-shin bilaterally without dysmetria, rapid alternating movements symmetric  Station/Gait:  deferred        Stroke Scales       NIHSS  1a. Level of Consciousness 0-->Alert, keenly responsive   1b. LOC Questions 0-->Answers both questions correctly   1c. LOC Commands 0-->Performs both tasks correctly   2.   Best Gaze 0-->Normal   3.   Visual 0-->No visual loss   4.   Facial Palsy 0-->Normal symmetrical movements   5a. Motor Arm, Left 0-->No drift, limb holds 90 (or 45) degrees for full 10 secs   5b. Motor Arm, Right 0-->No drift, limb holds 90 (or 45) degrees for full 10 secs   6a. Motor Leg, Left 0-->No drift, leg holds 30 degree " "position for full 5 secs   6b. Motor Leg, right 0-->No drift, leg holds 30 degree position for full 5 secs   7.   Limb Ataxia 0-->Absent   8.   Sensory 0-->Normal, no sensory loss   9.   Best Language 0-->No aphasia, normal   10. Dysarthria 0-->Normal   11. Extinction and Inattention  0-->No abnormality   Total 0 (02/14/25 1259)            Labs     CBC  No results found for: \"HGB\", \"HCT\", \"WBC\", \"PLT\"    BMP  Lab Results   Component Value Date     11/08/2018    POTASSIUM 4.4 11/08/2018    CHLORIDE 107 11/08/2018    CO2 26 11/08/2018    BUN 16 11/08/2018    CR 0.91 11/08/2018    GLC 98 11/08/2018    KIMBERLY 9.2 11/08/2018       INR  No results found for: \"INR\"    Data   Stroke Code Data  (for stroke code without tele)  Stroke code activated 02/14/25  1234   First stroke provider response 02/14/25  1236   Last known normal 02/14/25  0900   Time of discovery (or onset of symptoms) 02/14/25  0900   Head CT read by Stroke Neuro Provider 02/14/25  1246   Was stroke code de-escalated? Yes  02/14/25  1306        Clinically Significant Risk Factors Present on Admission                       "

## 2025-02-14 NOTE — ED PROVIDER NOTES
Emergency Department Note      History of Present Illness     Chief Complaint   Neck Pain      HPI   Manjula Rivas is a 44 year old female who presents to the ED for evaluation of posterior headache.  She has a history of migraines but feels that this headache is different.  Typically she has an aura prior to migraine which she did not today.  She says she was not exerting himself but was sitting at work.  She was normal till 9 when she had onset of her headache.  She has never had a similar headache in the past.  She feels dizzy with gait disturbance.  No focal weakness or paresthesias or visual disturbance.  No recent fever, cough, congestion, or infectious symptoms.  She tried rizatriptan without relief of her symptoms.  She does follow with [facility] neurology for her migraine headaches.  She started topiramate about a week and a half ago and increased her dose yesterday.  She denies any trauma or falls.    Review of External Notes   Neurology notes reviewed from January 16, 2025.  The patient has known history of chronic headaches that are felt to be migraines.  She also has a meningioma.  Most recent MRI on January 17 did not demonstrate any acute findings.  There was no mass effect from the known meningioma.  She also had a stable lesion of the sella.    Past Medical History     Medical History and Problem List   Past Medical History:   Diagnosis Date    Depressive disorder     Hernia, abdominal     Migraines     Palpitations     STD (sexually transmitted disease)        Medications   BuPROPion HCl (WELLBUTRIN PO)  FLUoxetine (PROZAC) 10 MG capsule  Naproxen Sodium (ALEVE PO)  sodium chloride (OCEAN) 0.65 % nasal spray  SUMAtriptan Succinate (IMITREX PO)        Surgical History   No past surgical history on file.    Physical Exam     Patient Vitals for the past 24 hrs:   BP Temp Temp src Pulse Resp SpO2 Height Weight   02/14/25 1512 (!) 140/89 -- -- 97 18 99 % -- --   02/14/25 1400 134/77 -- -- 98 17 96  "% -- --   02/14/25 1341 -- -- -- 96 14 97 % 1.575 m (5' 2\") 104.3 kg (230 lb)   02/14/25 1330 134/83 -- -- 100 17 98 % -- --   02/14/25 1320 119/85 -- -- 96 14 98 % -- --   02/14/25 1300 123/64 -- -- 92 18 98 % -- --   02/14/25 1250 (!) 144/69 -- -- 98 12 100 % -- --   02/14/25 1220 137/89 98.2  F (36.8  C) Oral 105 19 98 % -- --   02/14/25 1200 127/85 97.9  F (36.6  C) -- 109 18 99 % -- --     Physical Exam  Constitutional:       General: She is not in acute distress.     Appearance: Normal appearance. She is not diaphoretic.   HENT:      Head: Atraumatic.      Right Ear: Tympanic membrane, ear canal and external ear normal.      Left Ear: Tympanic membrane, ear canal and external ear normal.      Mouth/Throat:      Mouth: Mucous membranes are moist.      Pharynx: No posterior oropharyngeal erythema.   Eyes:      General: No scleral icterus.     Conjunctiva/sclera: Conjunctivae normal.   Neck:      Comments: No nuchal rigidity or meningismus.  Cardiovascular:      Rate and Rhythm: Normal rate and regular rhythm.      Heart sounds: Normal heart sounds.   Pulmonary:      Effort: No respiratory distress.      Breath sounds: Normal breath sounds.   Abdominal:      General: Abdomen is flat. There is no distension.   Musculoskeletal:      Cervical back: Neck supple.   Skin:     General: Skin is warm.      Capillary Refill: Capillary refill takes less than 2 seconds.      Findings: No rash.   Neurological:      Mental Status: She is alert and oriented to person, place, and time.      Comments: Speech is fluent.  No facial asymmetry.   strength 5 out of 5 bilaterally in the hands.  Straight leg raise 5 out of 5 bilaterally.  Sensation light touch intact over the face, arms, and legs.  No arm drift.  She does not to finger-nose dysmetria.   Psychiatric:      Comments: Appears anxious.           Diagnostics     Lab Results   Labs Ordered and Resulted from Time of ED Arrival to Time of ED Departure   BASIC METABOLIC PANEL " - Abnormal       Result Value    Sodium 137      Potassium 4.3      Chloride 103      Carbon Dioxide (CO2) 20 (*)     Anion Gap 14      Urea Nitrogen 19.4      Creatinine 0.96 (*)     GFR Estimate 74      Calcium 9.1      Glucose 88     INR - Normal    INR 1.09     PARTIAL THROMBOPLASTIN TIME - Normal    aPTT 24     TROPONIN T, HIGH SENSITIVITY - Normal    Troponin T, High Sensitivity <6     HCG QUALITATIVE PREGNANCY - Normal    hCG Serum Qualitative Negative     CBC WITH PLATELETS AND DIFFERENTIAL    WBC Count 8.8      RBC Count 4.56      Hemoglobin 13.5      Hematocrit 40.2      MCV 88      MCH 29.6      MCHC 33.6      RDW 12.9      Platelet Count 327      % Neutrophils 80      % Lymphocytes 13      % Monocytes 5      % Eosinophils 2      % Basophils 0      % Immature Granulocytes 0      NRBCs per 100 WBC 0      Absolute Neutrophils 7.0      Absolute Lymphocytes 1.2      Absolute Monocytes 0.4      Absolute Eosinophils 0.2      Absolute Basophils 0.0      Absolute Immature Granulocytes 0.0      Absolute NRBCs 0.0     GLUCOSE MONITOR NURSING POCT   ROUTINE UA WITH MICROSCOPIC REFLEX TO CULTURE   TOPIRAMATE LEVEL       Imaging   CTA Head Neck with Contrast   Final Result   IMPRESSION:    HEAD CT:   1.  No acute intracranial process.      HEAD CTA:    1.  No vascular cutoff of the proximal major intracranial arteries.   2.  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      NECK CTA:   1.  Normal neck CTA.      Results discussed with Dr. Rodrigues at 12:56 PM on 02/14/2025.      CT Head w/o Contrast   Final Result   IMPRESSION:    HEAD CT:   1.  No acute intracranial process.      HEAD CTA:    1.  No vascular cutoff of the proximal major intracranial arteries.   2.  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      NECK CTA:   1.  Normal neck CTA.      Results discussed with Dr. Rodrigues at 12:56 PM on 02/14/2025.      MR Brain w/o & w Contrast     (Results Pending)       EKG   ECG results from 02/14/25   EKG 12-lead, tracing only     Value    Systolic Blood Pressure     Diastolic Blood Pressure     Ventricular Rate 100    Atrial Rate 100    WY Interval 164    QRS Duration 78        QTc 477    P Axis 38    R AXIS 6    T Axis 33    Interpretation ECG      Sinus rhythm  Cannot rule out Anterior infarct , age undetermined  Abnormal ECG  No previous ECGs available  Confirmed by GENERATED REPORT, COMPUTER ([id number]),  Kiesha Rosales ([id number]) on 2/14/2025 2:46:57 PM        ED Course      Medications Administered   Medications   gadobutrol (GADAVIST) injection 10 mL (has no administration in time range)   iopamidol (ISOVUE-370) solution 67 mL (67 mLs Intravenous $Given 2/14/25 1240)     And   sodium chloride 0.9 % bag for CT scan flush use (100 mLs As instructed $Given 2/14/25 1241)   metoclopramide (REGLAN) injection 10 mg (10 mg Intravenous $Given 2/14/25 1335)   diphenhydrAMINE (BENADRYL) injection 25 mg (25 mg Intravenous $Given 2/14/25 1337)   morphine (PF) injection 4 mg (4 mg Intravenous $Given 2/14/25 1334)   LORazepam (ATIVAN) injection 0.5 mg (0.5 mg Intravenous $Given 2/14/25 1517)   ondansetron (ZOFRAN) injection 4 mg (4 mg Intravenous $Given 2/14/25 1510)   sodium chloride 0.9% BOLUS 1,000 mL (1,000 mLs Intravenous $New Bag 2/14/25 1511)       Medical Decision Making / Diagnosis     CHIDI Rivas is a 44 year old female who presents to the ED for evaluation of a headache.  She felt that it was different from her prior migraine.  We did activate stroke protocol given the complaint of dizziness although she did not have any significant focal neurologic deficit.  CT/CTA negative for acute findings.  She did have an incidental saccular aneurysm.  Vascular neurology recommends outpatient follow-up for this.    MRI was recommended and is pending. This will be followed by Dr Benson.    Diagnosis     ICD-10-CM    1. Acute nonintractable headache,  unspecified headache type  R51.9               Ki Rodrigues MD  02/14/25 9819

## 2025-02-14 NOTE — ED NOTES
Received telephone call from MRI Tech that MRI not completed due to pt became nauseated with increased movement while on Table. THE provider notified. Order received for Ativan and Zofran. Plan: Will administer meds and send to MRI once nausea resolve.

## 2025-02-14 NOTE — DISCHARGE INSTRUCTIONS
Your scans today do not show any bleeding or stroke.    You were incidentally noted to have a small aneurysm which is enlargement of a blood vessel.  Please follow-up with the spine and brain clinic for further evaluation within the next week.    Return immediately to the ER for worsening headache or any new concerning changes.

## 2025-02-14 NOTE — ED TRIAGE NOTES
Pt sts she was at work and had sudden onset of neck pain radiating to back of ears. Pt also sts she is dizzy lightheaded. Started at 9am     Triage Assessment (Adult)       Row Name 02/14/25 1203          Triage Assessment    Airway WDL WDL        Respiratory WDL    Respiratory WDL WDL        Skin Circulation/Temperature WDL    Skin Circulation/Temperature WDL WDL        Cardiac WDL    Cardiac WDL WDL        Peripheral/Neurovascular WDL    Peripheral Neurovascular WDL WDL        Cognitive/Neuro/Behavioral WDL    Cognitive/Neuro/Behavioral WDL WDL

## 2025-02-15 NOTE — ED PROVIDER NOTES
Patient signed out to me by Dr. Rodrigues pending MRI.  MRI was negative.  Patient reevaluated, all questions answered and is stable for discharge.     Kim Benson MD  02/14/25 2795

## 2025-02-16 LAB — TOPIRAMATE SERPL-MCNC: 1.9 UG/ML

## 2025-02-17 ENCOUNTER — TELEPHONE (OUTPATIENT)
Dept: NURSING | Facility: CLINIC | Age: 45
End: 2025-02-17
Payer: COMMERCIAL

## 2025-02-17 NOTE — TELEPHONE ENCOUNTER
[facility]    Reason for call: Lab Result Notification     Lab Result (including Rx patient on, if applicable).  If culture, copy of lab report at bottom.  Lab Result:   Component      Latest Ref Rng 2/14/2025  12:28 PM   Topiramate Level      5.0 - 20.0 ug/mL 1.9 (L)       Legend:  (L) Low    Patient's current Symptoms:   NA    RN Recommendations/Instructions per [place] ED lab result protocol:   [facility] ED lab result protocol utilized: misc  Encouraged to relay result to her neurologist.        Alessio Gutierrez RN

## 2025-02-19 NOTE — CONFIDENTIAL NOTE
NEUROSURGERY - NEW PREVISIT PLANNING    Referring Provider: Ki Rodrigues MD    [facility] ED 02/14/2025   Reason For Visit: ICA aneurysm       IMAGING STATUS/LOCATION DATE/TYPE   MRI PACS 02/14/2025  Brain  [facility]   CT PACS 02/14/2025  CTA Head Neck & Head  [facility]   XRAY N/A    NOTES STATUS/LOCATION DATE/TYPE   Other specialist [facility]: Care Everywhere / Neurology 01/16/2025   EMG N/A    INJECTION N/A    PHYSICAL THERAPY N/A    SURGERY N/A

## 2025-02-21 ENCOUNTER — PRE VISIT (OUTPATIENT)
Dept: NEUROSURGERY | Facility: CLINIC | Age: 45
End: 2025-02-21

## 2025-02-23 ENCOUNTER — HEALTH MAINTENANCE LETTER (OUTPATIENT)
Age: 45
End: 2025-02-23

## 2025-03-04 ENCOUNTER — TELEPHONE (OUTPATIENT)
Dept: NEUROSURGERY | Facility: CLINIC | Age: 45
End: 2025-03-04
Payer: COMMERCIAL

## 2025-03-04 NOTE — TELEPHONE ENCOUNTER
Left Voicemail (1st Attempt) for the patient to call back and schedule the following:    Appointment type: Rtn vascular neurosurg - in person or virtual  Provider: Julia Moy  Return date: End of Aug 2026  Specialty phone number: [phone]  Additional appointment(s) needed: CTA prior  Additonal Notes: Cerebral aneurysm, nonruptured/ CTA prior.

## 2025-04-22 ENCOUNTER — APPOINTMENT (OUTPATIENT)
Dept: GENERAL RADIOLOGY | Facility: CLINIC | Age: 45
End: 2025-04-22
Attending: STUDENT IN AN ORGANIZED HEALTH CARE EDUCATION/TRAINING PROGRAM
Payer: COMMERCIAL

## 2025-04-22 ENCOUNTER — HOSPITAL ENCOUNTER (EMERGENCY)
Facility: CLINIC | Age: 45
Discharge: HOME | End: 2025-04-22
Attending: STUDENT IN AN ORGANIZED HEALTH CARE EDUCATION/TRAINING PROGRAM | Admitting: STUDENT IN AN ORGANIZED HEALTH CARE EDUCATION/TRAINING PROGRAM
Payer: COMMERCIAL

## 2025-04-22 VITALS
DIASTOLIC BLOOD PRESSURE: 81 MMHG | RESPIRATION RATE: 14 BRPM | SYSTOLIC BLOOD PRESSURE: 139 MMHG | HEART RATE: 92 BPM | TEMPERATURE: 98.1 F | OXYGEN SATURATION: 97 % | WEIGHT: 234.79 LBS | BODY MASS INDEX: 43.21 KG/M2 | HEIGHT: 62 IN

## 2025-04-22 DIAGNOSIS — R06.00 DYSPNEA, UNSPECIFIED TYPE: ICD-10-CM

## 2025-04-22 LAB
ANION GAP SERPL CALCULATED.3IONS-SCNC: 13 MMOL/L (ref 7–15)
BASOPHILS # BLD AUTO: 0.1 10E3/UL (ref 0–0.2)
BASOPHILS NFR BLD AUTO: 0 %
BUN SERPL-MCNC: 13.6 MG/DL (ref 6–20)
CALCIUM SERPL-MCNC: 9.8 MG/DL (ref 8.8–10.4)
CHLORIDE SERPL-SCNC: 103 MMOL/L (ref 98–107)
CREAT SERPL-MCNC: 0.89 MG/DL (ref 0.51–0.95)
D DIMER PPP FEU-MCNC: 0.27 UG/ML FEU (ref 0–0.5)
EGFRCR SERPLBLD CKD-EPI 2021: 81 ML/MIN/1.73M2
EOSINOPHIL # BLD AUTO: 0 10E3/UL (ref 0–0.7)
EOSINOPHIL NFR BLD AUTO: 0 %
ERYTHROCYTE [DISTWIDTH] IN BLOOD BY AUTOMATED COUNT: 12.8 % (ref 10–15)
GLUCOSE SERPL-MCNC: 104 MG/DL (ref 70–99)
HCO3 SERPL-SCNC: 23 MMOL/L (ref 22–29)
HCT VFR BLD AUTO: 38 % (ref 35–47)
HGB BLD-MCNC: 12.8 G/DL (ref 11.7–15.7)
HOLD SPECIMEN: NORMAL
IMM GRANULOCYTES # BLD: 0.2 10E3/UL
IMM GRANULOCYTES NFR BLD: 1 %
LYMPHOCYTES # BLD AUTO: 2.3 10E3/UL (ref 0.8–5.3)
LYMPHOCYTES NFR BLD AUTO: 11 %
MCH RBC QN AUTO: 29.4 PG (ref 26.5–33)
MCHC RBC AUTO-ENTMCNC: 33.7 G/DL (ref 31.5–36.5)
MCV RBC AUTO: 87 FL (ref 78–100)
MONOCYTES # BLD AUTO: 0.3 10E3/UL (ref 0–1.3)
MONOCYTES NFR BLD AUTO: 2 %
NEUTROPHILS # BLD AUTO: 18.7 10E3/UL (ref 1.6–8.3)
NEUTROPHILS NFR BLD AUTO: 87 %
NRBC # BLD AUTO: 0 10E3/UL
NRBC BLD AUTO-RTO: 0 /100
PLATELET # BLD AUTO: 460 10E3/UL (ref 150–450)
POTASSIUM SERPL-SCNC: 4.7 MMOL/L (ref 3.4–5.3)
RBC # BLD AUTO: 4.35 10E6/UL (ref 3.8–5.2)
SODIUM SERPL-SCNC: 139 MMOL/L (ref 135–145)
TROPONIN T SERPL HS-MCNC: <6 NG/L
WBC # BLD AUTO: 21.6 10E3/UL (ref 4–11)

## 2025-04-22 PROCEDURE — 82310 ASSAY OF CALCIUM: CPT | Performed by: STUDENT IN AN ORGANIZED HEALTH CARE EDUCATION/TRAINING PROGRAM

## 2025-04-22 PROCEDURE — 99285 EMERGENCY DEPT VISIT HI MDM: CPT | Mod: 25

## 2025-04-22 PROCEDURE — 93005 ELECTROCARDIOGRAM TRACING: CPT

## 2025-04-22 PROCEDURE — 71046 X-RAY EXAM CHEST 2 VIEWS: CPT

## 2025-04-22 PROCEDURE — 80048 BASIC METABOLIC PNL TOTAL CA: CPT | Performed by: STUDENT IN AN ORGANIZED HEALTH CARE EDUCATION/TRAINING PROGRAM

## 2025-04-22 PROCEDURE — 84484 ASSAY OF TROPONIN QUANT: CPT | Performed by: STUDENT IN AN ORGANIZED HEALTH CARE EDUCATION/TRAINING PROGRAM

## 2025-04-22 PROCEDURE — 85004 AUTOMATED DIFF WBC COUNT: CPT | Performed by: STUDENT IN AN ORGANIZED HEALTH CARE EDUCATION/TRAINING PROGRAM

## 2025-04-22 PROCEDURE — 85379 FIBRIN DEGRADATION QUANT: CPT | Performed by: STUDENT IN AN ORGANIZED HEALTH CARE EDUCATION/TRAINING PROGRAM

## 2025-04-22 PROCEDURE — 36415 COLL VENOUS BLD VENIPUNCTURE: CPT | Performed by: STUDENT IN AN ORGANIZED HEALTH CARE EDUCATION/TRAINING PROGRAM

## 2025-04-22 PROCEDURE — 93005 ELECTROCARDIOGRAM TRACING: CPT | Mod: 76

## 2025-04-22 ASSESSMENT — COLUMBIA-SUICIDE SEVERITY RATING SCALE - C-SSRS
1. IN THE PAST MONTH, HAVE YOU WISHED YOU WERE DEAD OR WISHED YOU COULD GO TO SLEEP AND NOT WAKE UP?: NO
2. HAVE YOU ACTUALLY HAD ANY THOUGHTS OF KILLING YOURSELF IN THE PAST MONTH?: NO
6. HAVE YOU EVER DONE ANYTHING, STARTED TO DO ANYTHING, OR PREPARED TO DO ANYTHING TO END YOUR LIFE?: NO

## 2025-04-22 ASSESSMENT — ACTIVITIES OF DAILY LIVING (ADL)
ADLS_ACUITY_SCORE: 41

## 2025-04-22 NOTE — ED TRIAGE NOTES
Patient states she was seen last week for a cough and told it was asthma.  Patient states today while driving home she became SOB and diaphoretic with tightness in her chest.       Triage Assessment (Adult)       Row Name 04/22/25 9861          Triage Assessment    Airway WDL WDL        Respiratory WDL    Respiratory WDL WDL        Skin Circulation/Temperature WDL    Skin Circulation/Temperature WDL WDL        Cardiac WDL    Cardiac WDL WDL        Peripheral/Neurovascular WDL    Peripheral Neurovascular WDL WDL        Cognitive/Neuro/Behavioral WDL    Cognitive/Neuro/Behavioral WDL WDL

## 2025-04-22 NOTE — ED PROVIDER NOTES
Emergency Department Note      History of Present Illness     Chief Complaint   Shortness of Breath      HPI   Manjula Rivas is a 44 year old female who presents to the ED for shortness of breath. The patient reports that she was seen last week for a cough and shortness of breath. She was diagnosed with asthma and prescribed a week's worth of prednisone. She states that she was feeling well enough today to go to work. However, towards the end of her shift, she started to experience hot flashes and diaphoresis. Driving back home from work, the patient developed shortness of breath once again. She wanted to be seen in the ED because her chest started feeling tight as well. Her shortness of breath improves when she lays flat. She has been using her inhaler which provides no relief. The patient adds that she has been feeling generally unwell for the past 6-7 weeks with cough. She states that she stays in bed and only gets up to go to work. Denies fever, leg swelling or pain, taking hormones, recent surgeries, smoking, alcohol use, and drug use. No history of DVT or heart issues.      Independent Historian   None    Review of External Notes   none    Past Medical History     Medical History and Problem List   Past Medical History:   Diagnosis Date    Depressive disorder     Hernia, abdominal     Migraines     Palpitations     STD (sexually transmitted disease)        Medications   ALPRAZolam (XANAX) 0.25 MG tablet  buPROPion (WELLBUTRIN XL) 300 MG 24 hr tablet  FLUoxetine (PROZAC) 10 MG capsule  prochlorperazine (COMPAZINE) 10 MG tablet  rizatriptan (MAXALT) 10 MG tablet  topiramate (TOPAMAX) 15 MG capsule  Vitamin D3 (CHOLECALCIFEROL) 25 mcg (1000 units) tablet        Surgical History   No past surgical history on file.    Physical Exam     Patient Vitals for the past 24 hrs:   BP Temp Temp src Pulse Resp SpO2 Height Weight   04/22/25 1845 139/81 -- -- 92 -- 97 % -- --   04/22/25 1822 -- -- -- -- 14 -- -- --  "  04/22/25 1817 (!) 152/99 -- -- 96 -- 97 % -- --   04/22/25 1807 -- -- -- -- 13 -- -- --   04/22/25 1802 (!) 166/98 -- -- 105 -- 99 % -- --   04/22/25 1749 (!) 164/93 -- -- 111 -- 96 % -- --   04/22/25 1746 (!) 160/98 -- -- 106 -- 97 % -- --   04/22/25 1725 (!) 187/93 98.1  F (36.7  C) Oral 118 18 99 % 1.575 m (5' 2\") 106.5 kg (234 lb 12.6 oz)     Physical Exam  GENERAL: Patient appears anxious   HEAD: Atraumatic.  NECK: No rigidity  CV: Tachycardic and regular, no murmurs, rubs or gallops  PULM: CTAB with good aeration; no retractions, rales, rhonchi, or wheezing  ABD: Soft, nontender, nondistended, no guarding  DERM: No rash. Skin warm and dry  EXTREMITY: Moving all extremities without difficulty. No calf tenderness or peripheral edema  VASCULAR: Symmetric pulses bilaterally      Diagnostics     Lab Results   Labs Ordered and Resulted from Time of ED Arrival to Time of ED Departure   BASIC METABOLIC PANEL - Abnormal       Result Value    Sodium 139      Potassium 4.7      Chloride 103      Carbon Dioxide (CO2) 23      Anion Gap 13      Urea Nitrogen 13.6      Creatinine 0.89      GFR Estimate 81      Calcium 9.8      Glucose 104 (*)    CBC WITH PLATELETS AND DIFFERENTIAL - Abnormal    WBC Count 21.6 (*)     RBC Count 4.35      Hemoglobin 12.8      Hematocrit 38.0      MCV 87      MCH 29.4      MCHC 33.7      RDW 12.8      Platelet Count 460 (*)     % Neutrophils 87      % Lymphocytes 11      % Monocytes 2      % Eosinophils 0      % Basophils 0      % Immature Granulocytes 1      NRBCs per 100 WBC 0      Absolute Neutrophils 18.7 (*)     Absolute Lymphocytes 2.3      Absolute Monocytes 0.3      Absolute Eosinophils 0.0      Absolute Basophils 0.1      Absolute Immature Granulocytes 0.2      Absolute NRBCs 0.0     D DIMER QUANTITATIVE - Normal    D-Dimer Quantitative 0.27     TROPONIN T, HIGH SENSITIVITY - Normal    Troponin T, High Sensitivity <6         Imaging   XR Chest 2 Views   Final Result   IMPRESSION: " Negative chest.          EKG   ECG taken at 1803, ECG read at 1810  Sinus tachycardia   Cannot rule out inferior infract, age undetermined   Cannot rule out anterior infarct, age undetermined    QTC is normal as compared to prior, dated 4/22/25.  Rate 105 bpm. WY interval 142 ms. QRS duration 80 ms. QT/QTc 336/444 ms. P-R-T axes 36 -2 22.  No STEMI/STD.  Nonspecific T wave changes.  Overall ECG unchanged from February 14, 2025.  Independent Interpretation   CXR: No pneumothorax, cardiomegaly, or mediastinal widening.    ED Course      Medications Administered   Medications - No data to display    Procedures   Procedures     Discussion of Management   None    ED Course   ED Course as of 04/22/25 2204   Tue Apr 22, 2025   1751 I obtained history and performed physical exam.    1948 XR Chest 2 Views  Negative.   2104 I rechecked the patient. She is feeling better.        Additional Documentation  None    Medical Decision Making / Diagnosis     CMS Diagnoses: None    MIPS       None    [facility]   Manjula Rivas is a 44 year old female with reported recent diagnosis of asthma, presents anxious, hypertensive, tachycardic.  Initial assessment, she was in distress, but nontoxic.  Her ECG is unchanged from her baseline ECG.  Her troponin is completely negative. Per [facility] hsTrop, ACS essentially excluded. I do not suspect this is ACS.  She is low risk by Wells and her D-dimer is negative, do not think this is PE and do not think we require a CT scan.  Chest x-ray reassuring.  Clinical picture not consistent with dissection.  Has a leukocytosis of 21, but has been on steroids for asthma.  I do not see signs of infection that require antibiotics.  Reassessed and she is resting comfortably and now feels content and is calm.  Perform trial of ambulation and she did well and her sats were 97%.  Ultimately, this is a reassuring clinical picture.  I do not think she requires admission at this time.  Patient is comfortable with  close outpatient follow-up.  All questions answered.  Discharged in stable condition.    Disposition   The patient was discharged.     Diagnosis     ICD-10-CM    1. Dyspnea, unspecified type  R06.00            Discharge Medications   Discharge Medication List as of 4/22/2025  9:12 PM            Scribe Disclosure:  Joseph FRANK, am serving as a scribe at 5:58 PM on 4/22/2025 to document services personally performed by Kevin Laureano MD based on my observations and the provider's statements to me.        Kevin Laureano MD  04/22/25 2600

## 2025-04-23 LAB
ATRIAL RATE - MUSE: 105 BPM
ATRIAL RATE - MUSE: 112 BPM
DIASTOLIC BLOOD PRESSURE - MUSE: NORMAL MMHG
DIASTOLIC BLOOD PRESSURE - MUSE: NORMAL MMHG
INTERPRETATION ECG - MUSE: NORMAL
INTERPRETATION ECG - MUSE: NORMAL
P AXIS - MUSE: 36 DEGREES
P AXIS - MUSE: NORMAL DEGREES
PR INTERVAL - MUSE: 104 MS
PR INTERVAL - MUSE: 142 MS
QRS DURATION - MUSE: 78 MS
QRS DURATION - MUSE: 80 MS
QT - MUSE: 336 MS
QT - MUSE: 474 MS
QTC - MUSE: 444 MS
QTC - MUSE: 647 MS
R AXIS - MUSE: -2 DEGREES
R AXIS - MUSE: 1 DEGREES
SYSTOLIC BLOOD PRESSURE - MUSE: NORMAL MMHG
SYSTOLIC BLOOD PRESSURE - MUSE: NORMAL MMHG
T AXIS - MUSE: 22 DEGREES
T AXIS - MUSE: 37 DEGREES
VENTRICULAR RATE- MUSE: 105 BPM
VENTRICULAR RATE- MUSE: 112 BPM

## 2025-04-23 NOTE — ED NOTES
Ambulated pt down ED hallway approximately 40 feet while attached to a pulse ox. The Pts O2 sats stayed at 97%. Pt stated they did not feel dizzy but did feel a little SOB during ambulation. Pt had a steady gait that is normal for them.

## 2025-04-23 NOTE — DISCHARGE INSTRUCTIONS
Return to the emergency department if symptoms are worsening, become concerning, or for any other concerns. Follow-up with your doctor in 2-3 days and sooner if needed.

## 2025-05-15 ENCOUNTER — LAB REQUISITION (OUTPATIENT)
Dept: LAB | Facility: CLINIC | Age: 45
End: 2025-05-15

## 2025-05-15 DIAGNOSIS — N95.1 MENOPAUSAL AND FEMALE CLIMACTERIC STATES: ICD-10-CM

## 2025-05-15 DIAGNOSIS — Z12.4 ENCOUNTER FOR SCREENING FOR MALIGNANT NEOPLASM OF CERVIX: ICD-10-CM

## 2025-05-15 LAB — FSH SERPL IRP2-ACNC: 3.6 MIU/ML

## 2025-05-15 PROCEDURE — 83001 ASSAY OF GONADOTROPIN (FSH): CPT | Performed by: OBSTETRICS & GYNECOLOGY

## 2025-05-20 LAB
BKR AP ASSOCIATED HPV REPORT: NORMAL
BKR LAB AP GYN ADEQUACY: NORMAL
BKR LAB AP GYN INTERPRETATION: NORMAL
BKR LAB AP LMP: NORMAL
BKR LAB AP PREVIOUS ABNL DX: NORMAL
BKR LAB AP PREVIOUS ABNORMAL: NORMAL
PATH REPORT.COMMENTS IMP SPEC: NORMAL
PATH REPORT.COMMENTS IMP SPEC: NORMAL
PATH REPORT.RELEVANT HX SPEC: NORMAL

## 2025-08-12 ENCOUNTER — HOSPITAL ENCOUNTER (EMERGENCY)
Facility: CLINIC | Age: 45
Discharge: HOME | End: 2025-08-12
Attending: EMERGENCY MEDICINE
Payer: COMMERCIAL

## 2025-08-12 VITALS
RESPIRATION RATE: 18 BRPM | SYSTOLIC BLOOD PRESSURE: 136 MMHG | DIASTOLIC BLOOD PRESSURE: 83 MMHG | HEART RATE: 80 BPM | TEMPERATURE: 98.4 F | OXYGEN SATURATION: 99 %

## 2025-08-12 DIAGNOSIS — G43.809 OTHER MIGRAINE WITHOUT STATUS MIGRAINOSUS, NOT INTRACTABLE: Primary | ICD-10-CM

## 2025-08-12 PROCEDURE — 96375 TX/PRO/DX INJ NEW DRUG ADDON: CPT

## 2025-08-12 PROCEDURE — 99284 EMERGENCY DEPT VISIT MOD MDM: CPT | Mod: 25 | Performed by: EMERGENCY MEDICINE

## 2025-08-12 PROCEDURE — 96361 HYDRATE IV INFUSION ADD-ON: CPT

## 2025-08-12 PROCEDURE — 250N000011 HC RX IP 250 OP 636: Performed by: EMERGENCY MEDICINE

## 2025-08-12 PROCEDURE — 258N000003 HC RX IP 258 OP 636: Performed by: EMERGENCY MEDICINE

## 2025-08-12 PROCEDURE — 96374 THER/PROPH/DIAG INJ IV PUSH: CPT

## 2025-08-12 RX ORDER — METOCLOPRAMIDE HYDROCHLORIDE 5 MG/ML
10 INJECTION INTRAMUSCULAR; INTRAVENOUS ONCE
Status: COMPLETED | OUTPATIENT
Start: 2025-08-12 | End: 2025-08-12

## 2025-08-12 RX ORDER — METOCLOPRAMIDE 10 MG/1
10 TABLET ORAL 3 TIMES DAILY PRN
Qty: 12 TABLET | Refills: 0 | Status: SHIPPED | OUTPATIENT
Start: 2025-08-12

## 2025-08-12 RX ORDER — DIPHENHYDRAMINE HYDROCHLORIDE 50 MG/ML
25 INJECTION, SOLUTION INTRAMUSCULAR; INTRAVENOUS ONCE
Status: COMPLETED | OUTPATIENT
Start: 2025-08-12 | End: 2025-08-12

## 2025-08-12 RX ORDER — KETOROLAC TROMETHAMINE 10 MG/1
10 TABLET, FILM COATED ORAL EVERY 6 HOURS PRN
Qty: 12 TABLET | Refills: 0 | Status: SHIPPED | OUTPATIENT
Start: 2025-08-12

## 2025-08-12 RX ORDER — ONDANSETRON 4 MG/1
4 TABLET, ORALLY DISINTEGRATING ORAL EVERY 8 HOURS PRN
Qty: 12 TABLET | Refills: 0 | Status: SHIPPED | OUTPATIENT
Start: 2025-08-12

## 2025-08-12 RX ORDER — KETOROLAC TROMETHAMINE 15 MG/ML
15 INJECTION, SOLUTION INTRAMUSCULAR; INTRAVENOUS ONCE
Status: COMPLETED | OUTPATIENT
Start: 2025-08-12 | End: 2025-08-12

## 2025-08-12 RX ORDER — DEXAMETHASONE SODIUM PHOSPHATE 10 MG/ML
10 INJECTION, SOLUTION INTRAMUSCULAR; INTRAVENOUS ONCE
Status: COMPLETED | OUTPATIENT
Start: 2025-08-12 | End: 2025-08-12

## 2025-08-12 RX ADMIN — DEXAMETHASONE SODIUM PHOSPHATE 10 MG: 10 INJECTION, SOLUTION INTRAMUSCULAR; INTRAVENOUS at 18:43

## 2025-08-12 RX ADMIN — METOCLOPRAMIDE 10 MG: 5 INJECTION, SOLUTION INTRAMUSCULAR; INTRAVENOUS at 17:54

## 2025-08-12 RX ADMIN — KETOROLAC TROMETHAMINE 15 MG: 15 INJECTION, SOLUTION INTRAMUSCULAR; INTRAVENOUS at 17:54

## 2025-08-12 RX ADMIN — SODIUM CHLORIDE 1000 ML: 9 INJECTION, SOLUTION INTRAVENOUS at 17:54

## 2025-08-12 RX ADMIN — DIPHENHYDRAMINE HYDROCHLORIDE 25 MG: 50 INJECTION, SOLUTION INTRAMUSCULAR; INTRAVENOUS at 17:54

## 2025-08-12 ASSESSMENT — ACTIVITIES OF DAILY LIVING (ADL)
ADLS_ACUITY_SCORE: 41

## 2025-08-22 ENCOUNTER — HOSPITAL ENCOUNTER (OUTPATIENT)
Dept: CT IMAGING | Facility: CLINIC | Age: 45
Discharge: HOME | End: 2025-08-22
Attending: PHYSICIAN ASSISTANT | Admitting: PHYSICIAN ASSISTANT
Payer: COMMERCIAL

## 2025-08-22 DIAGNOSIS — I67.1 CEREBRAL ANEURYSM, NONRUPTURED: ICD-10-CM

## 2025-08-22 PROCEDURE — 255N000002 HC RX 255 OP 636: Performed by: PHYSICIAN ASSISTANT

## 2025-08-22 PROCEDURE — 70496 CT ANGIOGRAPHY HEAD: CPT

## 2025-08-22 PROCEDURE — 250N000009 HC RX 250: Performed by: PHYSICIAN ASSISTANT

## 2025-08-22 RX ADMIN — IOHEXOL 71 ML: 350 INJECTION, SOLUTION INTRAVENOUS at 07:05

## 2025-08-22 RX ADMIN — SODIUM CHLORIDE 100 ML: 9 INJECTION, SOLUTION INTRAVENOUS at 07:05
